# Patient Record
Sex: MALE | Race: BLACK OR AFRICAN AMERICAN | NOT HISPANIC OR LATINO | Employment: OTHER | URBAN - METROPOLITAN AREA
[De-identification: names, ages, dates, MRNs, and addresses within clinical notes are randomized per-mention and may not be internally consistent; named-entity substitution may affect disease eponyms.]

---

## 2021-06-18 ENCOUNTER — TELEPHONE (OUTPATIENT)
Dept: ADMINISTRATIVE | Facility: OTHER | Age: 66
End: 2021-06-18

## 2022-04-06 ENCOUNTER — TELEPHONE (OUTPATIENT)
Dept: INTERNAL MEDICINE | Facility: CLINIC | Age: 67
End: 2022-04-06
Payer: MEDICARE

## 2022-04-06 NOTE — TELEPHONE ENCOUNTER
----- Message from Gabriela Jo sent at 4/6/2022  1:18 PM CDT -----  Contact: [phone] Tiera Cook is calling to a;so get a recommendation for a dentist.Please advise

## 2023-01-10 ENCOUNTER — TELEPHONE (OUTPATIENT)
Dept: PRIMARY CARE CLINIC | Facility: CLINIC | Age: 68
End: 2023-01-10
Payer: MEDICARE

## 2023-01-10 NOTE — TELEPHONE ENCOUNTER
----- Message from Breanna Brumfield sent at 1/10/2023  1:06 PM CST -----  Nigel Chan calling regarding Appointment Access for wanting to see a Primary care doctor as soon as possible, call back [phone]

## 2023-01-25 ENCOUNTER — OFFICE VISIT (OUTPATIENT)
Dept: PRIMARY CARE CLINIC | Facility: CLINIC | Age: 68
End: 2023-01-25
Payer: MEDICARE

## 2023-01-25 VITALS
RESPIRATION RATE: 16 BRPM | WEIGHT: 217.13 LBS | HEART RATE: 68 BPM | OXYGEN SATURATION: 97 % | BODY MASS INDEX: 30.4 KG/M2 | DIASTOLIC BLOOD PRESSURE: 60 MMHG | SYSTOLIC BLOOD PRESSURE: 126 MMHG | HEIGHT: 71 IN

## 2023-01-25 DIAGNOSIS — R73.09 INCREASED GLUCOSE LEVEL: ICD-10-CM

## 2023-01-25 DIAGNOSIS — R73.09 OTHER ABNORMAL GLUCOSE: ICD-10-CM

## 2023-01-25 DIAGNOSIS — Z11.59 NEED FOR HEPATITIS C SCREENING TEST: ICD-10-CM

## 2023-01-25 DIAGNOSIS — Z12.5 ENCOUNTER FOR SCREENING FOR MALIGNANT NEOPLASM OF PROSTATE: ICD-10-CM

## 2023-01-25 DIAGNOSIS — Z11.4 ENCOUNTER FOR SCREENING FOR HIV: ICD-10-CM

## 2023-01-25 DIAGNOSIS — E78.5 DYSLIPIDEMIA: ICD-10-CM

## 2023-01-25 DIAGNOSIS — F17.210 CIGARETTE SMOKER: ICD-10-CM

## 2023-01-25 DIAGNOSIS — R39.11 URINARY HESITANCY: ICD-10-CM

## 2023-01-25 DIAGNOSIS — Z76.89 ENCOUNTER TO ESTABLISH CARE: Primary | ICD-10-CM

## 2023-01-25 DIAGNOSIS — Z12.11 COLON CANCER SCREENING: ICD-10-CM

## 2023-01-25 DIAGNOSIS — Z87.891 FORMER SMOKER: ICD-10-CM

## 2023-01-25 PROCEDURE — 93010 ELECTROCARDIOGRAM REPORT: CPT | Mod: HCWC,S$GLB,, | Performed by: INTERNAL MEDICINE

## 2023-01-25 PROCEDURE — 93005 ELECTROCARDIOGRAM TRACING: CPT | Mod: HCWC,S$GLB,, | Performed by: STUDENT IN AN ORGANIZED HEALTH CARE EDUCATION/TRAINING PROGRAM

## 2023-01-25 PROCEDURE — 1101F PT FALLS ASSESS-DOCD LE1/YR: CPT | Mod: HCWC,CPTII,S$GLB, | Performed by: STUDENT IN AN ORGANIZED HEALTH CARE EDUCATION/TRAINING PROGRAM

## 2023-01-25 PROCEDURE — 1159F MED LIST DOCD IN RCRD: CPT | Mod: HCWC,CPTII,S$GLB, | Performed by: STUDENT IN AN ORGANIZED HEALTH CARE EDUCATION/TRAINING PROGRAM

## 2023-01-25 PROCEDURE — 3078F DIAST BP <80 MM HG: CPT | Mod: HCWC,CPTII,S$GLB, | Performed by: STUDENT IN AN ORGANIZED HEALTH CARE EDUCATION/TRAINING PROGRAM

## 2023-01-25 PROCEDURE — 99999 PR PBB SHADOW E&M-EST. PATIENT-LVL III: CPT | Mod: PBBFAC,HCWC,, | Performed by: STUDENT IN AN ORGANIZED HEALTH CARE EDUCATION/TRAINING PROGRAM

## 2023-01-25 PROCEDURE — 1126F PR PAIN SEVERITY QUANTIFIED, NO PAIN PRESENT: ICD-10-PCS | Mod: HCWC,CPTII,S$GLB, | Performed by: STUDENT IN AN ORGANIZED HEALTH CARE EDUCATION/TRAINING PROGRAM

## 2023-01-25 PROCEDURE — 1101F PR PT FALLS ASSESS DOC 0-1 FALLS W/OUT INJ PAST YR: ICD-10-PCS | Mod: HCWC,CPTII,S$GLB, | Performed by: STUDENT IN AN ORGANIZED HEALTH CARE EDUCATION/TRAINING PROGRAM

## 2023-01-25 PROCEDURE — 3074F SYST BP LT 130 MM HG: CPT | Mod: HCWC,CPTII,S$GLB, | Performed by: STUDENT IN AN ORGANIZED HEALTH CARE EDUCATION/TRAINING PROGRAM

## 2023-01-25 PROCEDURE — 1160F RVW MEDS BY RX/DR IN RCRD: CPT | Mod: HCWC,CPTII,S$GLB, | Performed by: STUDENT IN AN ORGANIZED HEALTH CARE EDUCATION/TRAINING PROGRAM

## 2023-01-25 PROCEDURE — 3074F PR MOST RECENT SYSTOLIC BLOOD PRESSURE < 130 MM HG: ICD-10-PCS | Mod: HCWC,CPTII,S$GLB, | Performed by: STUDENT IN AN ORGANIZED HEALTH CARE EDUCATION/TRAINING PROGRAM

## 2023-01-25 PROCEDURE — 93010 EKG 12-LEAD: ICD-10-PCS | Mod: HCWC,S$GLB,, | Performed by: INTERNAL MEDICINE

## 2023-01-25 PROCEDURE — 99999 PR PBB SHADOW E&M-EST. PATIENT-LVL III: ICD-10-PCS | Mod: PBBFAC,HCWC,, | Performed by: STUDENT IN AN ORGANIZED HEALTH CARE EDUCATION/TRAINING PROGRAM

## 2023-01-25 PROCEDURE — 93005 EKG 12-LEAD: ICD-10-PCS | Mod: HCWC,S$GLB,, | Performed by: STUDENT IN AN ORGANIZED HEALTH CARE EDUCATION/TRAINING PROGRAM

## 2023-01-25 PROCEDURE — 99204 OFFICE O/P NEW MOD 45 MIN: CPT | Mod: HCWC,S$GLB,, | Performed by: STUDENT IN AN ORGANIZED HEALTH CARE EDUCATION/TRAINING PROGRAM

## 2023-01-25 PROCEDURE — 3008F PR BODY MASS INDEX (BMI) DOCUMENTED: ICD-10-PCS | Mod: HCWC,CPTII,S$GLB, | Performed by: STUDENT IN AN ORGANIZED HEALTH CARE EDUCATION/TRAINING PROGRAM

## 2023-01-25 PROCEDURE — 3288F PR FALLS RISK ASSESSMENT DOCUMENTED: ICD-10-PCS | Mod: HCWC,CPTII,S$GLB, | Performed by: STUDENT IN AN ORGANIZED HEALTH CARE EDUCATION/TRAINING PROGRAM

## 2023-01-25 PROCEDURE — 1159F PR MEDICATION LIST DOCUMENTED IN MEDICAL RECORD: ICD-10-PCS | Mod: HCWC,CPTII,S$GLB, | Performed by: STUDENT IN AN ORGANIZED HEALTH CARE EDUCATION/TRAINING PROGRAM

## 2023-01-25 PROCEDURE — 3078F PR MOST RECENT DIASTOLIC BLOOD PRESSURE < 80 MM HG: ICD-10-PCS | Mod: HCWC,CPTII,S$GLB, | Performed by: STUDENT IN AN ORGANIZED HEALTH CARE EDUCATION/TRAINING PROGRAM

## 2023-01-25 PROCEDURE — 1126F AMNT PAIN NOTED NONE PRSNT: CPT | Mod: HCWC,CPTII,S$GLB, | Performed by: STUDENT IN AN ORGANIZED HEALTH CARE EDUCATION/TRAINING PROGRAM

## 2023-01-25 PROCEDURE — 3288F FALL RISK ASSESSMENT DOCD: CPT | Mod: HCWC,CPTII,S$GLB, | Performed by: STUDENT IN AN ORGANIZED HEALTH CARE EDUCATION/TRAINING PROGRAM

## 2023-01-25 PROCEDURE — 99204 PR OFFICE/OUTPT VISIT, NEW, LEVL IV, 45-59 MIN: ICD-10-PCS | Mod: HCWC,S$GLB,, | Performed by: STUDENT IN AN ORGANIZED HEALTH CARE EDUCATION/TRAINING PROGRAM

## 2023-01-25 PROCEDURE — 3008F BODY MASS INDEX DOCD: CPT | Mod: HCWC,CPTII,S$GLB, | Performed by: STUDENT IN AN ORGANIZED HEALTH CARE EDUCATION/TRAINING PROGRAM

## 2023-01-25 PROCEDURE — 1160F PR REVIEW ALL MEDS BY PRESCRIBER/CLIN PHARMACIST DOCUMENTED: ICD-10-PCS | Mod: HCWC,CPTII,S$GLB, | Performed by: STUDENT IN AN ORGANIZED HEALTH CARE EDUCATION/TRAINING PROGRAM

## 2023-01-25 NOTE — PATIENT INSTRUCTIONS
Est Care:   - 67-year-old man presenting to [facility].  No acute concerns other than decreased urination.  Will be getting screening labs including CBC, CMP, TSH, T4, A1c and lipids.    Urinary hesitancy:   - patient's wife has noted patient having decreased urine stream over the last several months, asked him to be seen.  He states that he is having increased urinary frequency at night.  Denies dysuria or urgency.   - getting PSA today.    Family History of heart disease:   - patient has strong family history of heart disease including heart attacks in parents, grandparents and multiple siblings.   - blood pressure controlled today.  Heart exam normal on physical exam.   - will get EKG as well as will be checking patient's cholesterol.    History prediabetes:   - patient has never been treated for diabetes, but did have a A1c of 5.8 and 5.9 back in 2013.  Will recheck A1c today.

## 2023-01-25 NOTE — PROGRESS NOTES
"Subjective:           Patient ID: Nigel Chan is a 67 y.o. male who presents today with a chief complaint of est care.    Chief Complaint:   Establish Care      History of Present Illness:    66yo male presenting to est care.  Has not been getting care from PCP for years.    Had been in [place] and not treatment since return.    Has been having urinary retention and decreased flow.     Not other acute complaints at this time.     Had stress or Echo after [employer] at [employer] that was reportedly abnormal.     Review of Systems   Constitutional: Negative.  Negative for fatigue, fever and unexpected weight change.   HENT: Negative.  Negative for congestion, sinus pressure, sinus pain and sneezing.    Eyes: Negative.    Respiratory: Negative.  Negative for cough, shortness of breath and wheezing.    Cardiovascular: Negative.  Negative for chest pain, palpitations and leg swelling.   Gastrointestinal: Negative.  Negative for abdominal distention, constipation, diarrhea, nausea and vomiting.   Endocrine: Negative.    Genitourinary:  Positive for difficulty urinating and frequency. Negative for urgency.   Musculoskeletal: Negative.  Negative for arthralgias and back pain.   Skin: Negative.  Negative for rash.   Allergic/Immunologic: Negative for food allergies.   Neurological:  Negative for weakness, light-headedness, numbness and headaches.   Psychiatric/Behavioral: Negative.  Negative for sleep disturbance.          Objective:        Vitals:    01/25/23 0910   BP: 126/60   BP Location: Right arm   Patient Position: Sitting   BP Method: Medium (Manual)   Pulse: 68   Resp: 16   SpO2: 97%   Weight: 98.5 kg (217 lb 2.5 oz)   Height: 5' 11" (1.803 m)       Body mass index is 30.29 kg/m².      Physical Exam  Vitals reviewed.   Constitutional:       General: He is not in acute distress.     Appearance: Normal appearance. He is obese. He is not ill-appearing.      Comments: As per BMI.   HENT:      Head: Normocephalic and " atraumatic.      Right Ear: Tympanic membrane and external ear normal.      Left Ear: Tympanic membrane and external ear normal.      Nose: Nose normal. No rhinorrhea.   Eyes:      Extraocular Movements: Extraocular movements intact.      Conjunctiva/sclera: Conjunctivae normal.   Cardiovascular:      Rate and Rhythm: Normal rate and regular rhythm.      Heart sounds: No murmur heard.  Pulmonary:      Effort: Pulmonary effort is normal. No respiratory distress.      Breath sounds: No wheezing.   Abdominal:      Tenderness: There is no right CVA tenderness.   Musculoskeletal:         General: No swelling or deformity.      Cervical back: Normal range of motion.      Right lower leg: No edema.      Left lower leg: No edema.   Skin:     Capillary Refill: Capillary refill takes less than 2 seconds.   Neurological:      General: No focal deficit present.      Mental Status: He is alert and oriented to person, place, and time.      Gait: Gait normal.   Psychiatric:         Mood and Affect: Mood normal.           No results found for: NA, K, CL, CO2, BUN, CREATININE, GLUCOSE, ANIONGAP  No results found for: HGBA1C  No results found for: BNP, BNPTRIAGEBLO    No results found for: WBC, HGB, HCT, PLT, GRAN  No results found for: CHOL, HDL, LDLCALC, TRIG     No current outpatient medications on file.     No outpatient encounter medications on file as of 1/25/2023.     No facility-administered encounter medications on file as of 1/25/2023.          Assessment:       1. Encounter to establish care    2. Urinary hesitancy    3. Dyslipidemia    4. Former smoker    5. Increased glucose level    6. Cigarette smoker    7. Colon cancer screening    8. Encounter for screening for HIV    9. Need for hepatitis C screening test    10. Other abnormal glucose    11. Encounter for screening for malignant neoplasm of prostate           Plan:       Encounter to establish care    Urinary hesitancy  -     CBC Auto Differential; Future; Expected  date: 01/25/2023  -     Comprehensive Metabolic Panel; Future; Expected date: 01/25/2023  -     Lipid Panel; Future; Expected date: 01/25/2023  -     Hemoglobin A1C; Future; Expected date: 01/25/2023  -     TSH; Future; Expected date: 01/25/2023  -     PSA, Screening; Future; Expected date: 01/25/2023    Dyslipidemia  -     CBC Auto Differential; Future; Expected date: 01/25/2023  -     Comprehensive Metabolic Panel; Future; Expected date: 01/25/2023  -     Lipid Panel; Future; Expected date: 01/25/2023  -     Hemoglobin A1C; Future; Expected date: 01/25/2023  -     TSH; Future; Expected date: 01/25/2023  -     EKG 12-lead    Former smoker  -     CT Chest Lung Screening Low Dose; Future; Expected date: 01/25/2023    Increased glucose level    Cigarette smoker  -     CT Chest Lung Screening Low Dose; Future; Expected date: 01/25/2023    Colon cancer screening  -     Cologuard Screening (Multitarget Stool DNA); Future; Expected date: 01/25/2023    Encounter for screening for HIV  -     HIV 1/2 Ag/Ab (4th Gen); Future; Expected date: 01/25/2023    Need for hepatitis C screening test  -     Hepatitis C Antibody; Future; Expected date: 01/25/2023    Other abnormal glucose  -     Hemoglobin A1C; Future; Expected date: 01/25/2023    Encounter for screening for malignant neoplasm of prostate  -     PSA, Screening; Future; Expected date: 01/25/2023               Est Care:   - 67-year-old man presenting to [facility] care.  No acute concerns other than decreased urination.  Will be getting screening labs including CBC, CMP, TSH, T4, A1c and lipids.    Urinary hesitancy:   - patient's wife has noted patient having decreased urine stream over the last several months, asked him to be seen.  He states that he is having increased urinary frequency at night.  Denies dysuria or urgency.   - getting PSA today.    Family History of heart disease:   - patient has strong family history of heart disease including heart attacks in parents,  grandparents and multiple siblings.   - blood pressure controlled today.  Heart exam normal on physical exam.   - will get EKG as well as will be checking patient's cholesterol.    History prediabetes:   - patient has never been treated for diabetes, but did have a A1c of 5.8 and 5.9 back in 2013.  Will recheck A1c today.

## 2023-02-02 DIAGNOSIS — R73.03 PREDIABETES: ICD-10-CM

## 2023-02-02 DIAGNOSIS — R97.20 ELEVATED PSA: Primary | ICD-10-CM

## 2023-02-02 DIAGNOSIS — E78.5 HYPERLIPIDEMIA, UNSPECIFIED HYPERLIPIDEMIA TYPE: ICD-10-CM

## 2023-02-02 RX ORDER — ATORVASTATIN CALCIUM 40 MG/1
40 TABLET, FILM COATED ORAL DAILY
Qty: 90 TABLET | Refills: 3 | Status: SHIPPED | OUTPATIENT
Start: 2023-02-02 | End: 2023-03-21 | Stop reason: SDUPTHER

## 2023-02-09 LAB — NONINV COLON CA DNA+OCC BLD SCRN STL QL: NEGATIVE

## 2023-03-21 ENCOUNTER — OFFICE VISIT (OUTPATIENT)
Dept: PRIMARY CARE CLINIC | Facility: CLINIC | Age: 68
End: 2023-03-21
Payer: MEDICARE

## 2023-03-21 VITALS
RESPIRATION RATE: 16 BRPM | HEIGHT: 71 IN | OXYGEN SATURATION: 98 % | DIASTOLIC BLOOD PRESSURE: 72 MMHG | HEART RATE: 62 BPM | BODY MASS INDEX: 30.53 KG/M2 | WEIGHT: 218.06 LBS | SYSTOLIC BLOOD PRESSURE: 138 MMHG | TEMPERATURE: 97 F

## 2023-03-21 DIAGNOSIS — Z12.5 ENCOUNTER FOR SCREENING FOR MALIGNANT NEOPLASM OF PROSTATE: ICD-10-CM

## 2023-03-21 DIAGNOSIS — E78.5 HYPERLIPIDEMIA, UNSPECIFIED HYPERLIPIDEMIA TYPE: ICD-10-CM

## 2023-03-21 DIAGNOSIS — R73.03 PREDIABETES: ICD-10-CM

## 2023-03-21 DIAGNOSIS — R97.20 ELEVATED PSA: ICD-10-CM

## 2023-03-21 DIAGNOSIS — Z00.00 ANNUAL PHYSICAL EXAM: Primary | ICD-10-CM

## 2023-03-21 PROCEDURE — 3075F PR MOST RECENT SYSTOLIC BLOOD PRESS GE 130-139MM HG: ICD-10-PCS | Mod: HCWC,CPTII,S$GLB, | Performed by: STUDENT IN AN ORGANIZED HEALTH CARE EDUCATION/TRAINING PROGRAM

## 2023-03-21 PROCEDURE — 3075F SYST BP GE 130 - 139MM HG: CPT | Mod: HCWC,CPTII,S$GLB, | Performed by: STUDENT IN AN ORGANIZED HEALTH CARE EDUCATION/TRAINING PROGRAM

## 2023-03-21 PROCEDURE — 3078F PR MOST RECENT DIASTOLIC BLOOD PRESSURE < 80 MM HG: ICD-10-PCS | Mod: HCWC,CPTII,S$GLB, | Performed by: STUDENT IN AN ORGANIZED HEALTH CARE EDUCATION/TRAINING PROGRAM

## 2023-03-21 PROCEDURE — 3044F HG A1C LEVEL LT 7.0%: CPT | Mod: HCWC,CPTII,S$GLB, | Performed by: STUDENT IN AN ORGANIZED HEALTH CARE EDUCATION/TRAINING PROGRAM

## 2023-03-21 PROCEDURE — 1126F AMNT PAIN NOTED NONE PRSNT: CPT | Mod: HCWC,CPTII,S$GLB, | Performed by: STUDENT IN AN ORGANIZED HEALTH CARE EDUCATION/TRAINING PROGRAM

## 2023-03-21 PROCEDURE — 3044F PR MOST RECENT HEMOGLOBIN A1C LEVEL <7.0%: ICD-10-PCS | Mod: HCWC,CPTII,S$GLB, | Performed by: STUDENT IN AN ORGANIZED HEALTH CARE EDUCATION/TRAINING PROGRAM

## 2023-03-21 PROCEDURE — 1126F PR PAIN SEVERITY QUANTIFIED, NO PAIN PRESENT: ICD-10-PCS | Mod: HCWC,CPTII,S$GLB, | Performed by: STUDENT IN AN ORGANIZED HEALTH CARE EDUCATION/TRAINING PROGRAM

## 2023-03-21 PROCEDURE — 3008F PR BODY MASS INDEX (BMI) DOCUMENTED: ICD-10-PCS | Mod: HCWC,CPTII,S$GLB, | Performed by: STUDENT IN AN ORGANIZED HEALTH CARE EDUCATION/TRAINING PROGRAM

## 2023-03-21 PROCEDURE — 99214 OFFICE O/P EST MOD 30 MIN: CPT | Mod: HCWC,S$GLB,, | Performed by: STUDENT IN AN ORGANIZED HEALTH CARE EDUCATION/TRAINING PROGRAM

## 2023-03-21 PROCEDURE — 1101F PT FALLS ASSESS-DOCD LE1/YR: CPT | Mod: HCWC,CPTII,S$GLB, | Performed by: STUDENT IN AN ORGANIZED HEALTH CARE EDUCATION/TRAINING PROGRAM

## 2023-03-21 PROCEDURE — 3288F PR FALLS RISK ASSESSMENT DOCUMENTED: ICD-10-PCS | Mod: HCWC,CPTII,S$GLB, | Performed by: STUDENT IN AN ORGANIZED HEALTH CARE EDUCATION/TRAINING PROGRAM

## 2023-03-21 PROCEDURE — 1160F PR REVIEW ALL MEDS BY PRESCRIBER/CLIN PHARMACIST DOCUMENTED: ICD-10-PCS | Mod: HCWC,CPTII,S$GLB, | Performed by: STUDENT IN AN ORGANIZED HEALTH CARE EDUCATION/TRAINING PROGRAM

## 2023-03-21 PROCEDURE — 1159F PR MEDICATION LIST DOCUMENTED IN MEDICAL RECORD: ICD-10-PCS | Mod: HCWC,CPTII,S$GLB, | Performed by: STUDENT IN AN ORGANIZED HEALTH CARE EDUCATION/TRAINING PROGRAM

## 2023-03-21 PROCEDURE — 99214 PR OFFICE/OUTPT VISIT, EST, LEVL IV, 30-39 MIN: ICD-10-PCS | Mod: HCWC,S$GLB,, | Performed by: STUDENT IN AN ORGANIZED HEALTH CARE EDUCATION/TRAINING PROGRAM

## 2023-03-21 PROCEDURE — 3008F BODY MASS INDEX DOCD: CPT | Mod: HCWC,CPTII,S$GLB, | Performed by: STUDENT IN AN ORGANIZED HEALTH CARE EDUCATION/TRAINING PROGRAM

## 2023-03-21 PROCEDURE — 1101F PR PT FALLS ASSESS DOC 0-1 FALLS W/OUT INJ PAST YR: ICD-10-PCS | Mod: HCWC,CPTII,S$GLB, | Performed by: STUDENT IN AN ORGANIZED HEALTH CARE EDUCATION/TRAINING PROGRAM

## 2023-03-21 PROCEDURE — 3078F DIAST BP <80 MM HG: CPT | Mod: HCWC,CPTII,S$GLB, | Performed by: STUDENT IN AN ORGANIZED HEALTH CARE EDUCATION/TRAINING PROGRAM

## 2023-03-21 PROCEDURE — 3288F FALL RISK ASSESSMENT DOCD: CPT | Mod: HCWC,CPTII,S$GLB, | Performed by: STUDENT IN AN ORGANIZED HEALTH CARE EDUCATION/TRAINING PROGRAM

## 2023-03-21 PROCEDURE — 1160F RVW MEDS BY RX/DR IN RCRD: CPT | Mod: HCWC,CPTII,S$GLB, | Performed by: STUDENT IN AN ORGANIZED HEALTH CARE EDUCATION/TRAINING PROGRAM

## 2023-03-21 PROCEDURE — 99999 PR PBB SHADOW E&M-EST. PATIENT-LVL IV: CPT | Mod: PBBFAC,HCWC,, | Performed by: STUDENT IN AN ORGANIZED HEALTH CARE EDUCATION/TRAINING PROGRAM

## 2023-03-21 PROCEDURE — 99999 PR PBB SHADOW E&M-EST. PATIENT-LVL IV: ICD-10-PCS | Mod: PBBFAC,HCWC,, | Performed by: STUDENT IN AN ORGANIZED HEALTH CARE EDUCATION/TRAINING PROGRAM

## 2023-03-21 PROCEDURE — 1159F MED LIST DOCD IN RCRD: CPT | Mod: HCWC,CPTII,S$GLB, | Performed by: STUDENT IN AN ORGANIZED HEALTH CARE EDUCATION/TRAINING PROGRAM

## 2023-03-21 RX ORDER — ATORVASTATIN CALCIUM 40 MG/1
40 TABLET, FILM COATED ORAL DAILY
Qty: 90 TABLET | Refills: 3 | Status: SHIPPED | OUTPATIENT
Start: 2023-03-21 | End: 2023-03-21 | Stop reason: SDUPTHER

## 2023-03-21 RX ORDER — TAMSULOSIN HYDROCHLORIDE 0.4 MG/1
0.4 CAPSULE ORAL DAILY
Qty: 90 CAPSULE | Refills: 3 | Status: SHIPPED | OUTPATIENT
Start: 2023-03-21 | End: 2023-09-21 | Stop reason: SDUPTHER

## 2023-03-21 RX ORDER — ATORVASTATIN CALCIUM 80 MG/1
80 TABLET, FILM COATED ORAL DAILY
Qty: 90 TABLET | Refills: 3 | Status: SHIPPED | OUTPATIENT
Start: 2023-03-21 | End: 2024-03-20

## 2023-03-21 NOTE — PROGRESS NOTES
"Subjective:           Patient ID: Nigel Chan is a 68 y.o. male who presents today with a chief complaint of f/u labs.    Chief Complaint:   Follow-up (Lab results)      History of Present Illness:      PreDiabetes:   - Last A1c was 5.9%    Prostate:   - patient states that he has been having urgency, hesitancy sputtering and frequency. States that he does feel like he is getting empty, but still having to go again quickly.   - states he finds this stressful.    HLD:   Was on Crestor previously, took daily and tolerated well.  Was on that for 2 years, but stopped after Huong and getting back from [place].   Open to a med.    Will be back in [place] for the next 10 months.       Review of Systems   Constitutional: Negative.  Negative for fatigue, fever and unexpected weight change.   HENT: Negative.  Negative for congestion, sinus pressure, sinus pain and sneezing.    Eyes: Negative.    Respiratory: Negative.  Negative for cough, shortness of breath and wheezing.    Cardiovascular: Negative.  Negative for chest pain, palpitations and leg swelling.   Gastrointestinal: Negative.  Negative for abdominal distention, constipation, diarrhea, nausea and vomiting.   Endocrine: Negative.    Genitourinary:  Positive for decreased urine volume, difficulty urinating and frequency. Negative for urgency.   Musculoskeletal: Negative.  Negative for arthralgias and back pain.   Skin: Negative.  Negative for rash.   Allergic/Immunologic: Negative for food allergies.   Neurological:  Negative for weakness, light-headedness, numbness and headaches.   Psychiatric/Behavioral: Negative.  Negative for sleep disturbance.          Objective:        Vitals:    03/21/23 1154   BP: 138/72   BP Location: Right arm   Patient Position: Sitting   BP Method: Medium (Manual)   Pulse: 62   Resp: 16   Temp: 97.1 °F (36.2 °C)   TempSrc: Temporal   SpO2: 98%   Weight: 98.9 kg (218 lb 0.6 oz)   Height: 5' 11" (1.803 m)       Body mass index is " 30.41 kg/m².      Physical Exam  Vitals reviewed.   Constitutional:       General: He is not in acute distress.     Appearance: Normal appearance. He is obese. He is not ill-appearing.      Comments: As per BMI.   HENT:      Head: Normocephalic and atraumatic.      Right Ear: Tympanic membrane and external ear normal.      Left Ear: Tympanic membrane and external ear normal.      Nose: Nose normal. No rhinorrhea.   Eyes:      Extraocular Movements: Extraocular movements intact.      Conjunctiva/sclera: Conjunctivae normal.   Cardiovascular:      Rate and Rhythm: Normal rate and regular rhythm.      Heart sounds: No murmur heard.  Pulmonary:      Effort: Pulmonary effort is normal. No respiratory distress.      Breath sounds: No wheezing.   Abdominal:      Tenderness: There is no right CVA tenderness.   Musculoskeletal:         General: No swelling or deformity.      Cervical back: Normal range of motion.      Right lower leg: No edema.      Left lower leg: No edema.   Skin:     Capillary Refill: Capillary refill takes less than 2 seconds.   Neurological:      General: No focal deficit present.      Mental Status: He is alert and oriented to person, place, and time.      Gait: Gait normal.   Psychiatric:         Mood and Affect: Mood normal.           Lab Results   Component Value Date     02/01/2023    K 4.0 02/01/2023     02/01/2023    CO2 26 02/01/2023    BUN 11 02/01/2023    CREATININE 1.3 02/01/2023    ANIONGAP 10 02/01/2023     Lab Results   Component Value Date    HGBA1C 5.9 (H) 02/01/2023     No results found for: BNP, BNPTRIAGEBLO    Lab Results   Component Value Date    WBC 4.54 02/01/2023    HGB 14.6 02/01/2023    HCT 44.0 02/01/2023     02/01/2023    GRAN 2.4 02/01/2023    GRAN 53.8 02/01/2023     Lab Results   Component Value Date    CHOL 393 (H) 02/01/2023    HDL 61 02/01/2023    LDLCALC 303.2 (H) 02/01/2023    TRIG 144 02/01/2023          Current Outpatient Medications:      atorvastatin (LIPITOR) 80 MG tablet, Take 1 tablet (80 mg total) by mouth once daily., Disp: 90 tablet, Rfl: 3    tamsulosin (FLOMAX) 0.4 mg Cap, Take 1 capsule (0.4 mg total) by mouth once daily., Disp: 90 capsule, Rfl: 3     Outpatient Encounter Medications as of 3/21/2023   Medication Sig Dispense Refill    atorvastatin (LIPITOR) 80 MG tablet Take 1 tablet (80 mg total) by mouth once daily. 90 tablet 3    tamsulosin (FLOMAX) 0.4 mg Cap Take 1 capsule (0.4 mg total) by mouth once daily. 90 capsule 3    [DISCONTINUED] atorvastatin (LIPITOR) 40 MG tablet Take 1 tablet (40 mg total) by mouth once daily. (Patient not taking: Reported on 3/21/2023) 90 tablet 3    [DISCONTINUED] atorvastatin (LIPITOR) 40 MG tablet Take 1 tablet (40 mg total) by mouth once daily. 90 tablet 3     No facility-administered encounter medications on file as of 3/21/2023.          Assessment:       1. Annual physical exam    2. Hyperlipidemia, unspecified hyperlipidemia type    3. Elevated PSA    4. Prediabetes    5. Encounter for screening for malignant neoplasm of prostate           Plan:       Annual physical exam    Hyperlipidemia, unspecified hyperlipidemia type  -     Discontinue: atorvastatin (LIPITOR) 40 MG tablet; Take 1 tablet (40 mg total) by mouth once daily.  Dispense: 90 tablet; Refill: 3  -     atorvastatin (LIPITOR) 80 MG tablet; Take 1 tablet (80 mg total) by mouth once daily.  Dispense: 90 tablet; Refill: 3  -     Lipid Panel; Future; Expected date: 09/21/2023    Elevated PSA  -     tamsulosin (FLOMAX) 0.4 mg Cap; Take 1 capsule (0.4 mg total) by mouth once daily.  Dispense: 90 capsule; Refill: 3  -     PSA, Screening; Future; Expected date: 09/21/2023    Prediabetes  -     Discontinue: atorvastatin (LIPITOR) 40 MG tablet; Take 1 tablet (40 mg total) by mouth once daily.  Dispense: 90 tablet; Refill: 3  -     atorvastatin (LIPITOR) 80 MG tablet; Take 1 tablet (80 mg total) by mouth once daily.  Dispense: 90 tablet; Refill:  3  -     Lipid Panel; Future; Expected date: 09/21/2023  -     PSA, Screening; Future; Expected date: 09/21/2023    Encounter for screening for malignant neoplasm of prostate  -     PSA, Screening; Future; Expected date: 09/21/2023                 Prostate:   - patient states that he has been having urgency, hesitancy sputtering and frequency. States that he does feel like he is getting empty, but still having to go again quickly.   - states he finds this stressful.   - starting on Tamsulosin 0.4mg daily.    HLD:   Was on Crestor previously, took daily and tolerated well.  Was on that for 2 years, but stopped after Huong and getting back from [place].   Open to a med.    Will be back in [place] for the next 10 months.    - start on Atorvastatin 40mg daily for 2 weeks, then increase to 80mg daily.     PreDiabetes:   - Last A1c was 5.9%

## 2023-03-21 NOTE — PATIENT INSTRUCTIONS
Prostate:   - patient states that he has been having urgency, hesitancy sputtering and frequency. States that he does feel like he is getting empty, but still having to go again quickly.   - states he finds this stressful.   - starting on Tamsulosin 0.4mg daily.    HLD:   Was on Crestor previously, took daily and tolerated well.  Was on that for 2 years, but stopped after Huong and getting back from [place].   Open to a med.    Will be back in [place] for the next 10 months.    - start on Atorvastatin 40mg daily for 2 weeks, then increase to 80mg daily.     PreDiabetes:   - Last A1c was 5.9%

## 2023-06-12 ENCOUNTER — PES CALL (OUTPATIENT)
Dept: ADMINISTRATIVE | Facility: CLINIC | Age: 68
End: 2023-06-12
Payer: MEDICARE

## 2023-07-26 ENCOUNTER — TELEPHONE (OUTPATIENT)
Dept: PULMONOLOGY | Facility: CLINIC | Age: 68
End: 2023-07-26
Payer: MEDICARE

## 2023-07-26 DIAGNOSIS — Z87.891 HISTORY OF TOBACCO USE: Primary | ICD-10-CM

## 2023-07-26 NOTE — TELEPHONE ENCOUNTER
Called to schedule follow up ldct.  Patient significant other states patient is already scheduled for Monday 7/31.

## 2023-08-09 ENCOUNTER — TELEPHONE (OUTPATIENT)
Dept: PRIMARY CARE CLINIC | Facility: CLINIC | Age: 68
End: 2023-08-09
Payer: MEDICARE

## 2023-08-09 NOTE — TELEPHONE ENCOUNTER
----- Message from Julissa Zheng sent at 8/9/2023  9:17 AM CDT -----  Contact: Pt [phone]  [id number]    Type: Test Results    What test was performed? CT Scan     Who ordered the test? Dr Jose Martin Li    When and where were the test performed? [facility] 08/02/23    Would you like response via [employer]: Call    Comments:

## 2023-09-21 ENCOUNTER — OFFICE VISIT (OUTPATIENT)
Dept: PRIMARY CARE CLINIC | Facility: CLINIC | Age: 68
End: 2023-09-21
Payer: MEDICARE

## 2023-09-21 VITALS
BODY MASS INDEX: 30.86 KG/M2 | SYSTOLIC BLOOD PRESSURE: 130 MMHG | DIASTOLIC BLOOD PRESSURE: 70 MMHG | OXYGEN SATURATION: 97 % | RESPIRATION RATE: 16 BRPM | HEIGHT: 71 IN | HEART RATE: 66 BPM | TEMPERATURE: 97 F | WEIGHT: 220.44 LBS

## 2023-09-21 DIAGNOSIS — I70.0 AORTIC ATHEROSCLEROSIS: ICD-10-CM

## 2023-09-21 DIAGNOSIS — J43.9 PULMONARY EMPHYSEMA, UNSPECIFIED EMPHYSEMA TYPE: ICD-10-CM

## 2023-09-21 DIAGNOSIS — I73.9 RIGHT LEG CLAUDICATION: Primary | ICD-10-CM

## 2023-09-21 DIAGNOSIS — R73.03 PREDIABETES: ICD-10-CM

## 2023-09-21 DIAGNOSIS — Z12.5 ENCOUNTER FOR SCREENING FOR MALIGNANT NEOPLASM OF PROSTATE: ICD-10-CM

## 2023-09-21 DIAGNOSIS — R35.0 URINARY FREQUENCY: ICD-10-CM

## 2023-09-21 DIAGNOSIS — Z87.891 FORMER SMOKER: ICD-10-CM

## 2023-09-21 DIAGNOSIS — E78.5 HYPERLIPIDEMIA, UNSPECIFIED HYPERLIPIDEMIA TYPE: ICD-10-CM

## 2023-09-21 DIAGNOSIS — R97.20 ELEVATED PSA: ICD-10-CM

## 2023-09-21 PROCEDURE — 3075F PR MOST RECENT SYSTOLIC BLOOD PRESS GE 130-139MM HG: ICD-10-PCS | Mod: HCWC,CPTII,S$GLB, | Performed by: STUDENT IN AN ORGANIZED HEALTH CARE EDUCATION/TRAINING PROGRAM

## 2023-09-21 PROCEDURE — 1101F PT FALLS ASSESS-DOCD LE1/YR: CPT | Mod: HCWC,CPTII,S$GLB, | Performed by: STUDENT IN AN ORGANIZED HEALTH CARE EDUCATION/TRAINING PROGRAM

## 2023-09-21 PROCEDURE — 3288F PR FALLS RISK ASSESSMENT DOCUMENTED: ICD-10-PCS | Mod: HCWC,CPTII,S$GLB, | Performed by: STUDENT IN AN ORGANIZED HEALTH CARE EDUCATION/TRAINING PROGRAM

## 2023-09-21 PROCEDURE — 99999 PR PBB SHADOW E&M-EST. PATIENT-LVL IV: ICD-10-PCS | Mod: PBBFAC,HCWC,, | Performed by: STUDENT IN AN ORGANIZED HEALTH CARE EDUCATION/TRAINING PROGRAM

## 2023-09-21 PROCEDURE — 3008F BODY MASS INDEX DOCD: CPT | Mod: HCWC,CPTII,S$GLB, | Performed by: STUDENT IN AN ORGANIZED HEALTH CARE EDUCATION/TRAINING PROGRAM

## 2023-09-21 PROCEDURE — 3044F PR MOST RECENT HEMOGLOBIN A1C LEVEL <7.0%: ICD-10-PCS | Mod: HCWC,CPTII,S$GLB, | Performed by: STUDENT IN AN ORGANIZED HEALTH CARE EDUCATION/TRAINING PROGRAM

## 2023-09-21 PROCEDURE — 99214 OFFICE O/P EST MOD 30 MIN: CPT | Mod: HCWC,S$GLB,, | Performed by: STUDENT IN AN ORGANIZED HEALTH CARE EDUCATION/TRAINING PROGRAM

## 2023-09-21 PROCEDURE — 3078F PR MOST RECENT DIASTOLIC BLOOD PRESSURE < 80 MM HG: ICD-10-PCS | Mod: HCWC,CPTII,S$GLB, | Performed by: STUDENT IN AN ORGANIZED HEALTH CARE EDUCATION/TRAINING PROGRAM

## 2023-09-21 PROCEDURE — 3008F PR BODY MASS INDEX (BMI) DOCUMENTED: ICD-10-PCS | Mod: HCWC,CPTII,S$GLB, | Performed by: STUDENT IN AN ORGANIZED HEALTH CARE EDUCATION/TRAINING PROGRAM

## 2023-09-21 PROCEDURE — 1159F PR MEDICATION LIST DOCUMENTED IN MEDICAL RECORD: ICD-10-PCS | Mod: HCWC,CPTII,S$GLB, | Performed by: STUDENT IN AN ORGANIZED HEALTH CARE EDUCATION/TRAINING PROGRAM

## 2023-09-21 PROCEDURE — 99999 PR PBB SHADOW E&M-EST. PATIENT-LVL IV: CPT | Mod: PBBFAC,HCWC,, | Performed by: STUDENT IN AN ORGANIZED HEALTH CARE EDUCATION/TRAINING PROGRAM

## 2023-09-21 PROCEDURE — 1159F MED LIST DOCD IN RCRD: CPT | Mod: HCWC,CPTII,S$GLB, | Performed by: STUDENT IN AN ORGANIZED HEALTH CARE EDUCATION/TRAINING PROGRAM

## 2023-09-21 PROCEDURE — 3078F DIAST BP <80 MM HG: CPT | Mod: HCWC,CPTII,S$GLB, | Performed by: STUDENT IN AN ORGANIZED HEALTH CARE EDUCATION/TRAINING PROGRAM

## 2023-09-21 PROCEDURE — 3075F SYST BP GE 130 - 139MM HG: CPT | Mod: HCWC,CPTII,S$GLB, | Performed by: STUDENT IN AN ORGANIZED HEALTH CARE EDUCATION/TRAINING PROGRAM

## 2023-09-21 PROCEDURE — 3288F FALL RISK ASSESSMENT DOCD: CPT | Mod: HCWC,CPTII,S$GLB, | Performed by: STUDENT IN AN ORGANIZED HEALTH CARE EDUCATION/TRAINING PROGRAM

## 2023-09-21 PROCEDURE — 1126F AMNT PAIN NOTED NONE PRSNT: CPT | Mod: HCWC,CPTII,S$GLB, | Performed by: STUDENT IN AN ORGANIZED HEALTH CARE EDUCATION/TRAINING PROGRAM

## 2023-09-21 PROCEDURE — 99214 PR OFFICE/OUTPT VISIT, EST, LEVL IV, 30-39 MIN: ICD-10-PCS | Mod: HCWC,S$GLB,, | Performed by: STUDENT IN AN ORGANIZED HEALTH CARE EDUCATION/TRAINING PROGRAM

## 2023-09-21 PROCEDURE — 1126F PR PAIN SEVERITY QUANTIFIED, NO PAIN PRESENT: ICD-10-PCS | Mod: HCWC,CPTII,S$GLB, | Performed by: STUDENT IN AN ORGANIZED HEALTH CARE EDUCATION/TRAINING PROGRAM

## 2023-09-21 PROCEDURE — 1101F PR PT FALLS ASSESS DOC 0-1 FALLS W/OUT INJ PAST YR: ICD-10-PCS | Mod: HCWC,CPTII,S$GLB, | Performed by: STUDENT IN AN ORGANIZED HEALTH CARE EDUCATION/TRAINING PROGRAM

## 2023-09-21 PROCEDURE — 3044F HG A1C LEVEL LT 7.0%: CPT | Mod: HCWC,CPTII,S$GLB, | Performed by: STUDENT IN AN ORGANIZED HEALTH CARE EDUCATION/TRAINING PROGRAM

## 2023-09-21 RX ORDER — TAMSULOSIN HYDROCHLORIDE 0.4 MG/1
0.8 CAPSULE ORAL DAILY
Qty: 180 CAPSULE | Refills: 3 | Status: SHIPPED | OUTPATIENT
Start: 2023-09-21 | End: 2024-09-20

## 2023-09-21 NOTE — PROGRESS NOTES
Subjective:           Patient ID: Nigel Chan is a 68 y.o. male who presents today with a chief complaint of Follow-up (Cholesterol & prostate)  .    Chief Complaint:   Follow-up (Cholesterol & prostate)      History of Present Illness:    Nigel Chan is a 68 y.o. male who presents today with a chief complaint of Follow-up (Cholesterol & prostate)  .      Urination: states that he does have a bit of decreased flow, and having some sensation of incomplete emptying.  Gets up about 2-3 times a night to void.   Has to concentrate on the process a bit to get urine started.     Right calf pain, states gets then with exercise, after 2-3 minute of walking gets pain in right lower leg, will be tolerable and continues to exercise.    Hx smoking, not currently.    Cholesterol - taking statin as directed, has right calf pain, but thinks not related to med.      Review of Systems   Constitutional: Negative.  Negative for fatigue, fever and unexpected weight change.   HENT: Negative.  Negative for congestion, sinus pressure, sinus pain and sneezing.    Eyes: Negative.    Respiratory: Negative.  Negative for cough, shortness of breath and wheezing.    Cardiovascular: Negative.  Negative for chest pain, palpitations and leg swelling.   Gastrointestinal: Negative.  Negative for abdominal distention, constipation, diarrhea, nausea and vomiting.   Endocrine: Negative.    Genitourinary:  Positive for decreased urine volume, difficulty urinating and frequency. Negative for urgency.   Musculoskeletal: Negative.  Negative for arthralgias and back pain.   Skin: Negative.  Negative for rash.   Allergic/Immunologic: Negative for food allergies.   Neurological:  Negative for weakness, light-headedness, numbness and headaches.   Psychiatric/Behavioral: Negative.  Negative for sleep disturbance.            Objective:        Vitals:    09/21/23 0920   BP: 130/70   BP Location: Right arm   Patient Position: Sitting   BP Method:  "Medium (Manual)   Pulse: 66   Resp: 16   Temp: 97.2 °F (36.2 °C)   TempSrc: Temporal   SpO2: 97%   Weight: 100 kg (220 lb 7.4 oz)   Height: 5' 11" (1.803 m)       Body mass index is 30.75 kg/m².      Physical Exam  Vitals reviewed.   Constitutional:       General: He is not in acute distress.     Appearance: Normal appearance. He is obese. He is not ill-appearing.      Comments: As per BMI.   HENT:      Head: Normocephalic and atraumatic.      Right Ear: Tympanic membrane and external ear normal.      Left Ear: Tympanic membrane and external ear normal.      Nose: Nose normal. No rhinorrhea.   Eyes:      Extraocular Movements: Extraocular movements intact.      Conjunctiva/sclera: Conjunctivae normal.   Cardiovascular:      Rate and Rhythm: Normal rate and regular rhythm.      Heart sounds: No murmur heard.  Pulmonary:      Effort: Pulmonary effort is normal. No respiratory distress.      Breath sounds: No wheezing.   Abdominal:      Tenderness: There is no right CVA tenderness.   Musculoskeletal:         General: No swelling or deformity.      Cervical back: Normal range of motion.      Right lower leg: No edema.      Left lower leg: No edema.   Skin:     Capillary Refill: Capillary refill takes less than 2 seconds.   Neurological:      General: No focal deficit present.      Mental Status: He is alert and oriented to person, place, and time.      Gait: Gait normal.   Psychiatric:         Mood and Affect: Mood normal.             Lab Results   Component Value Date     02/01/2023    K 4.0 02/01/2023     02/01/2023    CO2 26 02/01/2023    BUN 11 02/01/2023    CREATININE 1.3 02/01/2023    ANIONGAP 10 02/01/2023     Lab Results   Component Value Date    HGBA1C 5.9 (H) 02/01/2023     No results found for: "BNP", "BNPTRIAGEBLO"    Lab Results   Component Value Date    WBC 4.54 02/01/2023    HGB 14.6 02/01/2023    HCT 44.0 02/01/2023     02/01/2023    GRAN 2.4 02/01/2023    GRAN 53.8 02/01/2023     Lab " Results   Component Value Date    CHOL 393 (H) 02/01/2023    HDL 61 02/01/2023    LDLCALC 303.2 (H) 02/01/2023    TRIG 144 02/01/2023          Current Outpatient Medications:     atorvastatin (LIPITOR) 80 MG tablet, Take 1 tablet (80 mg total) by mouth once daily., Disp: 90 tablet, Rfl: 3    tamsulosin (FLOMAX) 0.4 mg Cap, Take 2 capsules (0.8 mg total) by mouth once daily., Disp: 180 capsule, Rfl: 3     Outpatient Encounter Medications as of 9/21/2023   Medication Sig Dispense Refill    atorvastatin (LIPITOR) 80 MG tablet Take 1 tablet (80 mg total) by mouth once daily. 90 tablet 3    [DISCONTINUED] tamsulosin (FLOMAX) 0.4 mg Cap Take 1 capsule (0.4 mg total) by mouth once daily. 90 capsule 3    tamsulosin (FLOMAX) 0.4 mg Cap Take 2 capsules (0.8 mg total) by mouth once daily. 180 capsule 3     No facility-administered encounter medications on file as of 9/21/2023.          Assessment:       1. Right leg claudication    2. Aortic atherosclerosis    3. Pulmonary emphysema, unspecified emphysema type    4. Elevated PSA    5. Prediabetes    6. Hyperlipidemia, unspecified hyperlipidemia type    7. Former smoker    8. Encounter for screening for malignant neoplasm of prostate    9. Urinary frequency           Plan:       Right leg claudication  -     US Lower Extremity Arteries Right; Future; Expected date: 09/21/2023    Aortic atherosclerosis  -     US Abdominal Aorta; Future; Expected date: 09/21/2023    Pulmonary emphysema, unspecified emphysema type    Elevated PSA  -     PROSTATE SPECIFIC ANTIGEN, DIAGNOSTIC; Future; Expected date: 09/21/2023  -     tamsulosin (FLOMAX) 0.4 mg Cap; Take 2 capsules (0.8 mg total) by mouth once daily.  Dispense: 180 capsule; Refill: 3    Prediabetes  -     Hemoglobin A1C; Future; Expected date: 09/21/2023    Hyperlipidemia, unspecified hyperlipidemia type  -     US Lower Extremity Arteries Right; Future; Expected date: 09/21/2023  -     Lipid Panel; Future; Expected date:  09/21/2023  -     US Abdominal Aorta; Future; Expected date: 09/21/2023    Former smoker  -     US Lower Extremity Arteries Right; Future; Expected date: 09/21/2023  -     US Abdominal Aorta; Future; Expected date: 09/21/2023    Encounter for screening for malignant neoplasm of prostate    Urinary frequency  -     PROSTATE SPECIFIC ANTIGEN, DIAGNOSTIC; Future; Expected date: 09/21/2023                   Hyperlipidemia:    - patient has history of elevated cholesterol, last LDL was 303.   - started on high-dose statin, believes he has been tolerating well, though gets some right calf pain at times, thinks this is more related to a chronic injury that started in high school.   - advised that if muscle cramps become a persistent issue can start on coenzyme Q10.   - will recheck cholesterol, consider adding a secondary agent due to his very high cholesterol level before starting statin.    BPH:   - patient has signs of BPH with decreased urinary stream, hesitancy and sense of incomplete emptying.  Additionally is having nocturia getting up to urinate 2-3 times per night.   - patient was started on Flomax 0.4 mg daily, has not seen significant improvement in symptoms.  Will increase to 0.8 mg daily.   - patient's PSA was 4.1 in February, will recheck on labs today.  If increasing will have patient see Urology.    History of smoking:   - patient former smoker, high cholesterol, advise getting abdominal aortic aneurysm screen.  Order placed.    Right calf pain:   - patient getting claudication and right calf, worse with exercise.  Not present on the left side.  Will get arterial ultrasound of right leg due to his high cholesterol in this pain, concern for possible vascular occlusion or obstruction.

## 2023-09-21 NOTE — PATIENT INSTRUCTIONS
Hyperlipidemia:    - patient has history of elevated cholesterol, last LDL was 303.   - started on high-dose statin, believes he has been tolerating well, though gets some right calf pain at times, thinks this is more related to a chronic injury that started in high school.   - advised that if muscle cramps become a persistent issue can start on coenzyme Q10.   - will recheck cholesterol, consider adding a secondary agent due to his very high cholesterol level before starting statin.    BPH:   - patient has signs of BPH with decreased urinary stream, hesitancy and sense of incomplete emptying.  Additionally is having nocturia getting up to urinate 2-3 times per night.   - patient was started on Flomax 0.4 mg daily, has not seen significant improvement in symptoms.  Will increase to 0.8 mg daily.   - patient's PSA was 4.1 in February, will recheck on labs today.  If increasing will have patient see Urology.    History of smoking:   - patient former smoker, high cholesterol, advise getting abdominal aortic aneurysm screen.  Order placed.    Right calf pain:   - patient getting claudication and right calf, worse with exercise.  Not present on the left side.  Will get arterial ultrasound of right leg due to his high cholesterol in this pain, concern for possible vascular occlusion or obstruction.

## 2023-10-06 ENCOUNTER — TELEPHONE (OUTPATIENT)
Dept: PRIMARY CARE CLINIC | Facility: CLINIC | Age: 68
End: 2023-10-06
Payer: MEDICARE

## 2023-10-06 NOTE — TELEPHONE ENCOUNTER
----- Message from Nataliya Cortez sent at 10/6/2023  2:06 PM CDT -----  Contact: Patient, [phone]  Patient is returning a phone call.  Who left a message for the patient: Coni  Does patient know what this is regarding:  Lab results  Would you like a call back, or a response through your [facility] portal?:   Call back  Comments:  Missed your call, please call him back. Thanks.

## 2023-10-06 NOTE — TELEPHONE ENCOUNTER
----- Message from Celina Oh sent at 10/6/2023  2:17 PM CDT -----  Contact: Pt  [phone]  Patient is returning a phone call.  Who left a message for the patient: Coni Gonzalez MA   Does patient know what this is regarding:  yes  Would you like a call back, or a response through your [facility] portal?:   Call back  Comments:     Please call and advise.    Thank You

## 2023-10-26 DIAGNOSIS — I70.90 CALCIFICATION OF ARTERY: ICD-10-CM

## 2023-10-26 DIAGNOSIS — I73.9 RIGHT LEG CLAUDICATION: Primary | ICD-10-CM

## 2023-11-06 ENCOUNTER — TELEPHONE (OUTPATIENT)
Dept: PRIMARY CARE CLINIC | Facility: CLINIC | Age: 68
End: 2023-11-06
Payer: MEDICARE

## 2023-11-06 NOTE — TELEPHONE ENCOUNTER
Pt called (successfully) and notified of test results.      Pt needs another referral to cardiology/ vascular surgery

## 2023-11-06 NOTE — TELEPHONE ENCOUNTER
----- Message from Cindy Villegas sent at 11/6/2023 10:43 AM CST -----  Contact: [phone] @ patient  Patient is returning a phone call.yes   Who left a message for the patient: Coni Gonzalez MA   Does patient know what this is regarding:    Would you like a call back, or a response through your [facility] portal?:   call back   Comments:

## 2023-11-06 NOTE — TELEPHONE ENCOUNTER
----- Message from Nataliya Cortez sent at 11/6/2023 12:52 PM CST -----  Contact: Patient, [phone]  Patient is returning a phone call.  Who left a message for the patient: Noam   Does patient know what this is regarding:  Ultrasound results  Would you like a call back, or a response through your [facility] portal?:   Call back  Comments:  Missed your call, please call him back. Thanks.     This patient has been assessed with a concern for Malnutrition and has been determined to have a diagnosis/diagnoses of Severe protein-calorie malnutrition.    This patient is being managed with:   Diet Consistent Carbohydrate Renal/No Snacks-  Supplement Feeding Modality:  Oral  Nepro Cans or Servings Per Day:  2       Frequency:  Daily  Entered: Jul 14 2022  7:49PM

## 2023-11-07 DIAGNOSIS — I73.9 CLAUDICATION: Primary | ICD-10-CM

## 2023-11-09 DIAGNOSIS — I73.9 CLAUDICATION: Primary | ICD-10-CM

## 2023-12-11 ENCOUNTER — INITIAL CONSULT (OUTPATIENT)
Dept: VASCULAR SURGERY | Facility: CLINIC | Age: 68
End: 2023-12-11
Attending: SURGERY
Payer: MEDICARE

## 2023-12-11 ENCOUNTER — HOSPITAL ENCOUNTER (OUTPATIENT)
Dept: VASCULAR SURGERY | Facility: CLINIC | Age: 68
Discharge: HOME | End: 2023-12-11
Attending: SURGERY
Payer: MEDICARE

## 2023-12-11 VITALS
BODY MASS INDEX: 31.18 KG/M2 | HEART RATE: 73 BPM | SYSTOLIC BLOOD PRESSURE: 159 MMHG | HEIGHT: 71 IN | TEMPERATURE: 98 F | DIASTOLIC BLOOD PRESSURE: 84 MMHG | WEIGHT: 222.69 LBS

## 2023-12-11 DIAGNOSIS — I70.90 CALCIFICATION OF ARTERY: ICD-10-CM

## 2023-12-11 DIAGNOSIS — I70.211 ATHEROSCLEROSIS OF NATIVE ARTERIES OF EXTREMITIES WITH INTERMITTENT CLAUDICATION, RIGHT LEG: Primary | ICD-10-CM

## 2023-12-11 DIAGNOSIS — I73.9 CLAUDICATION: ICD-10-CM

## 2023-12-11 DIAGNOSIS — I73.9 PAD (PERIPHERAL ARTERY DISEASE): Primary | ICD-10-CM

## 2023-12-11 DIAGNOSIS — I73.9 RIGHT LEG CLAUDICATION: ICD-10-CM

## 2023-12-11 PROCEDURE — 93923 PR NON-INVASIVE PHYSIOLOGIC STUDY EXTREMITY 3 LEVELS: ICD-10-PCS | Mod: HCWC,S$GLB,, | Performed by: SURGERY

## 2023-12-11 PROCEDURE — 99203 OFFICE O/P NEW LOW 30 MIN: CPT | Mod: HCWC,S$GLB,, | Performed by: SURGERY

## 2023-12-11 PROCEDURE — 99999 PR PBB SHADOW E&M-EST. PATIENT-LVL III: CPT | Mod: PBBFAC,HCWC,, | Performed by: SURGERY

## 2023-12-11 PROCEDURE — 99999 PR PBB SHADOW E&M-EST. PATIENT-LVL III: ICD-10-PCS | Mod: PBBFAC,HCWC,, | Performed by: SURGERY

## 2023-12-11 PROCEDURE — 93923 UPR/LXTR ART STDY 3+ LVLS: CPT | Mod: HCWC,S$GLB,, | Performed by: SURGERY

## 2023-12-11 PROCEDURE — 99203 PR OFFICE/OUTPT VISIT, NEW, LEVL III, 30-44 MIN: ICD-10-PCS | Mod: HCWC,S$GLB,, | Performed by: SURGERY

## 2023-12-11 NOTE — PROGRESS NOTES
VASCULAR SURGERY NOTE    Patient ID: Nigel Chan is a 68 y.o. male.    I. HISTORY     Chief Complaint: Right leg claudication     HPI: Nigel Chan is a 68 y.o. male who is here today for new patient initial appointment.  He was referred for right leg claudication that has been going on for 1 year.  He complains of leg pain after walking approximately 1/8 of a mile. The pain resolves with rest in 1-2 minutes. The pain is mostly in the right calf but he states that it does feel to be affecting him proximal to the knee more often in the past several months. The quality of the pain is crampy in nature. The pain starts in the right calf first. He denies significant rest pain.     No anticoagulation. He takes a statin.     Past Medical History:   Diagnosis Date    Hyperlipidemia       No past surgical history on file.    Social History     Occupational History    Not on file   Tobacco Use    Smoking status: Former     Current packs/day: 0.00     Types: Cigarettes     Start date: 1974     Quit date: 2022     Years since quittin.9    Smokeless tobacco: Never   Substance and Sexual Activity    Alcohol use: Yes     Comment: less then 1 drink weekly, seasonal    Drug use: Yes     Types: Marijuana    Sexual activity: Yes     Partners: Female     Comment: single partner       Review of Systems   Constitutional: Negative for weight loss.   HENT:  Negative for ear pain and nosebleeds.    Eyes:  Negative for discharge and pain.   Cardiovascular:  Negative for chest pain and palpitations.   Respiratory:  Negative for cough, shortness of breath and wheezing.    Endocrine: Negative for cold intolerance, heat intolerance and polyphagia.   Hematologic/Lymphatic: Negative for adenopathy. Does not bruise/bleed easily.   Skin:  Negative for itching and rash.   Musculoskeletal:  Negative for joint swelling and muscle cramps.   Gastrointestinal:  Negative for abdominal pain, diarrhea, nausea and vomiting.    Genitourinary:  Negative for dysuria and flank pain.   Neurological:  Negative for numbness and seizures.         II. PHYSICAL EXAM     Physical Exam  Constitutional:       General: He is not in acute distress.     Appearance: Normal appearance. He is normal weight. He is not ill-appearing or diaphoretic.   HENT:      Head: Normocephalic and atraumatic.   Eyes:      General: No scleral icterus.        Right eye: No discharge.         Left eye: No discharge.      Extraocular Movements: Extraocular movements intact.      Conjunctiva/sclera: Conjunctivae normal.   Cardiovascular:      Rate and Rhythm: Normal rate and regular rhythm.   Pulmonary:      Effort: Pulmonary effort is normal. No respiratory distress.   Musculoskeletal:         General: Normal range of motion.      Cervical back: Normal range of motion and neck supple.      Right lower leg: No edema.      Left lower leg: No edema.   Skin:     General: Skin is warm and dry.      Coloration: Skin is not jaundiced or pale.      Findings: No erythema or rash.   Neurological:      General: No focal deficit present.      Mental Status: He is alert and oriented to person, place, and time.   Psychiatric:         Mood and Affect: Mood normal.         Behavior: Behavior normal.       RLE: 2+ Femoral pulse palpated. No wounds or skin lesions.   LLE: Femoral pulse palpated, 2+ DP pulse, PT pulse palpated. No skin wounds or lesions.     III. ASSESSMENT & PLAN (MEDICAL DECISION MAKING)     1. Atherosclerosis of native arteries of extremities with intermittent claudication, right leg    2. Right leg claudication    3. Calcification of artery        Imaging Results: (I have personally reviewed the images/studies and provided my interpretation below)    ANGELA 12/11/23:  R L   0.66 in PT; 0.65 in DP  0.90 in PT; 0.99 in DP      9/21/23 RLE arterial duplex:  Likely occlusion in distal SFA with change from triphasic in SFA to monophasic popliteal waveforms. Distal tibial  waveforms are biphasic suggesting robust collaterals.    Assessment/Diagnosis and Plan:  68 y.o. male with right leg claudication.  We had an extensive discussion regarding the treatment of claudication.  At this time his claudication symptoms only mildly limit his activities mainly during brisk walking and long distance walking.  We discussed risks and benefits of surgical treatment.  Since the patient is only mildly limited by his claudication symptoms he would like to forego any surgical treatment at this time and will return in 6 months to reassess his symptoms.    -Encouraged the patient to continue exercising as tolerated   -Tobacco cessation and the negative effect of tobacco as it pertains to vascular health was explained to the patient  -ASA 81mg daily recommended for all patients with PAD  -High intensity statin (atorvastatin 40mg or rosuvastatin 20mg) recommended for all patients with symptomatic PAD   -Control of atherosclerotic risk factors: Goal LDL <100, Goal HbA1C <7, Goal BP <140/90  -Follow up 6 months for symptom reassessment    RADHA Bcaa II, MD, RPVI  Vascular Surgeon  [facility]

## 2023-12-18 RX ORDER — ASPIRIN 81 MG/1
81 TABLET ORAL DAILY
COMMUNITY
Start: 2023-12-18

## 2023-12-20 ENCOUNTER — OFFICE VISIT (OUTPATIENT)
Dept: PRIMARY CARE CLINIC | Facility: CLINIC | Age: 68
End: 2023-12-20
Payer: MEDICARE

## 2023-12-20 VITALS
DIASTOLIC BLOOD PRESSURE: 68 MMHG | OXYGEN SATURATION: 97 % | WEIGHT: 225.19 LBS | TEMPERATURE: 98 F | SYSTOLIC BLOOD PRESSURE: 116 MMHG | BODY MASS INDEX: 31.52 KG/M2 | HEIGHT: 71 IN | HEART RATE: 80 BPM | RESPIRATION RATE: 16 BRPM

## 2023-12-20 DIAGNOSIS — Z12.5 ENCOUNTER FOR SCREENING FOR MALIGNANT NEOPLASM OF PROSTATE: ICD-10-CM

## 2023-12-20 DIAGNOSIS — R97.20 ELEVATED PSA: ICD-10-CM

## 2023-12-20 DIAGNOSIS — R35.0 URINARY FREQUENCY: ICD-10-CM

## 2023-12-20 DIAGNOSIS — I73.9 CLAUDICATION: ICD-10-CM

## 2023-12-20 DIAGNOSIS — N39.43 POST-VOID DRIBBLING: ICD-10-CM

## 2023-12-20 DIAGNOSIS — R39.11 URINARY HESITANCY: Primary | ICD-10-CM

## 2023-12-20 PROCEDURE — 3074F SYST BP LT 130 MM HG: CPT | Mod: HCWC,CPTII,S$GLB, | Performed by: STUDENT IN AN ORGANIZED HEALTH CARE EDUCATION/TRAINING PROGRAM

## 2023-12-20 PROCEDURE — 1159F MED LIST DOCD IN RCRD: CPT | Mod: HCWC,CPTII,S$GLB, | Performed by: STUDENT IN AN ORGANIZED HEALTH CARE EDUCATION/TRAINING PROGRAM

## 2023-12-20 PROCEDURE — 99214 OFFICE O/P EST MOD 30 MIN: CPT | Mod: HCWC,S$GLB,, | Performed by: STUDENT IN AN ORGANIZED HEALTH CARE EDUCATION/TRAINING PROGRAM

## 2023-12-20 PROCEDURE — 1159F PR MEDICATION LIST DOCUMENTED IN MEDICAL RECORD: ICD-10-PCS | Mod: HCWC,CPTII,S$GLB, | Performed by: STUDENT IN AN ORGANIZED HEALTH CARE EDUCATION/TRAINING PROGRAM

## 2023-12-20 PROCEDURE — 3008F PR BODY MASS INDEX (BMI) DOCUMENTED: ICD-10-PCS | Mod: HCWC,CPTII,S$GLB, | Performed by: STUDENT IN AN ORGANIZED HEALTH CARE EDUCATION/TRAINING PROGRAM

## 2023-12-20 PROCEDURE — 3008F BODY MASS INDEX DOCD: CPT | Mod: HCWC,CPTII,S$GLB, | Performed by: STUDENT IN AN ORGANIZED HEALTH CARE EDUCATION/TRAINING PROGRAM

## 2023-12-20 PROCEDURE — 3044F PR MOST RECENT HEMOGLOBIN A1C LEVEL <7.0%: ICD-10-PCS | Mod: HCWC,CPTII,S$GLB, | Performed by: STUDENT IN AN ORGANIZED HEALTH CARE EDUCATION/TRAINING PROGRAM

## 2023-12-20 PROCEDURE — 1126F PR PAIN SEVERITY QUANTIFIED, NO PAIN PRESENT: ICD-10-PCS | Mod: HCWC,CPTII,S$GLB, | Performed by: STUDENT IN AN ORGANIZED HEALTH CARE EDUCATION/TRAINING PROGRAM

## 2023-12-20 PROCEDURE — 3288F PR FALLS RISK ASSESSMENT DOCUMENTED: ICD-10-PCS | Mod: HCWC,CPTII,S$GLB, | Performed by: STUDENT IN AN ORGANIZED HEALTH CARE EDUCATION/TRAINING PROGRAM

## 2023-12-20 PROCEDURE — 1101F PR PT FALLS ASSESS DOC 0-1 FALLS W/OUT INJ PAST YR: ICD-10-PCS | Mod: HCWC,CPTII,S$GLB, | Performed by: STUDENT IN AN ORGANIZED HEALTH CARE EDUCATION/TRAINING PROGRAM

## 2023-12-20 PROCEDURE — 3044F HG A1C LEVEL LT 7.0%: CPT | Mod: HCWC,CPTII,S$GLB, | Performed by: STUDENT IN AN ORGANIZED HEALTH CARE EDUCATION/TRAINING PROGRAM

## 2023-12-20 PROCEDURE — 99214 PR OFFICE/OUTPT VISIT, EST, LEVL IV, 30-39 MIN: ICD-10-PCS | Mod: HCWC,S$GLB,, | Performed by: STUDENT IN AN ORGANIZED HEALTH CARE EDUCATION/TRAINING PROGRAM

## 2023-12-20 PROCEDURE — 3078F PR MOST RECENT DIASTOLIC BLOOD PRESSURE < 80 MM HG: ICD-10-PCS | Mod: HCWC,CPTII,S$GLB, | Performed by: STUDENT IN AN ORGANIZED HEALTH CARE EDUCATION/TRAINING PROGRAM

## 2023-12-20 PROCEDURE — 99999 PR PBB SHADOW E&M-EST. PATIENT-LVL IV: ICD-10-PCS | Mod: PBBFAC,HCWC,, | Performed by: STUDENT IN AN ORGANIZED HEALTH CARE EDUCATION/TRAINING PROGRAM

## 2023-12-20 PROCEDURE — 3288F FALL RISK ASSESSMENT DOCD: CPT | Mod: HCWC,CPTII,S$GLB, | Performed by: STUDENT IN AN ORGANIZED HEALTH CARE EDUCATION/TRAINING PROGRAM

## 2023-12-20 PROCEDURE — 99999 PR PBB SHADOW E&M-EST. PATIENT-LVL IV: CPT | Mod: PBBFAC,HCWC,, | Performed by: STUDENT IN AN ORGANIZED HEALTH CARE EDUCATION/TRAINING PROGRAM

## 2023-12-20 PROCEDURE — 1101F PT FALLS ASSESS-DOCD LE1/YR: CPT | Mod: HCWC,CPTII,S$GLB, | Performed by: STUDENT IN AN ORGANIZED HEALTH CARE EDUCATION/TRAINING PROGRAM

## 2023-12-20 PROCEDURE — 1160F RVW MEDS BY RX/DR IN RCRD: CPT | Mod: HCWC,CPTII,S$GLB, | Performed by: STUDENT IN AN ORGANIZED HEALTH CARE EDUCATION/TRAINING PROGRAM

## 2023-12-20 PROCEDURE — 3074F PR MOST RECENT SYSTOLIC BLOOD PRESSURE < 130 MM HG: ICD-10-PCS | Mod: HCWC,CPTII,S$GLB, | Performed by: STUDENT IN AN ORGANIZED HEALTH CARE EDUCATION/TRAINING PROGRAM

## 2023-12-20 PROCEDURE — 1126F AMNT PAIN NOTED NONE PRSNT: CPT | Mod: HCWC,CPTII,S$GLB, | Performed by: STUDENT IN AN ORGANIZED HEALTH CARE EDUCATION/TRAINING PROGRAM

## 2023-12-20 PROCEDURE — 1160F PR REVIEW ALL MEDS BY PRESCRIBER/CLIN PHARMACIST DOCUMENTED: ICD-10-PCS | Mod: HCWC,CPTII,S$GLB, | Performed by: STUDENT IN AN ORGANIZED HEALTH CARE EDUCATION/TRAINING PROGRAM

## 2023-12-20 PROCEDURE — 3078F DIAST BP <80 MM HG: CPT | Mod: HCWC,CPTII,S$GLB, | Performed by: STUDENT IN AN ORGANIZED HEALTH CARE EDUCATION/TRAINING PROGRAM

## 2023-12-20 NOTE — PATIENT INSTRUCTIONS
Urinary Frequency:   - has been taking Flomax to help with voiding.       Claudication:   - patient saw vascular surgery last week, ankle-brachial indices showed normal flow on left leg, but right leg was around 66%.  Was advised to take low-dose aspirin, take high-dose statin, participate in regular exercise.   - patient has former smoker, not current.   - patient states he bikes for 1-2 hours daily.  His HDL reflects this with very healthy 59 and 61 on recent checks.   - patient does state he gets some pains to right leg at times.   - vascular advise patient is not advised to stenting at this time, but would discuss further at follow-up in 1 year.    Elevated LDL:   - patient had very elevated LDL on earlier check, started on statin in room recheck significantly improved from LDL around 300 to near 130.    Elevated PSA:   - patient had PSA of 4.1, on recheck in 6-12 months had increase to 4.3.   - patient does have some urinary frequency, and possible decreased urinary stream.   - will recheck in 6-12 months, if continuing to increase will have patient see Urology.

## 2023-12-20 NOTE — PROGRESS NOTES
Subjective:           Patient ID: Nigel Chan is a 68 y.o. male who presents today with a chief complaint of Follow-up (Urinary issues & labs)  .    Chief Complaint:   Follow-up (Urinary issues & labs)      History of Present Illness:    Nigel Chan is a 68 y.o. male who presents today with a chief complaint of Follow-up (Urinary issues & labs)  .    67yo male having urinary frequency.    Is constant issues through day, can get to be every 15 minutes.  Has been using Flomax 0.8mg daily.     Does not drink overnight, but then drinks 3-4 bottles of water in AM before noon.    Does notice his urine being yellow.  Darker after drinking coffee and had an odor.  After vitamins can also be a bit darker.  Later in the day will be lighter.      Wife states having some intermittent stream issues.  Will go/stop when voiding over night.         Review of Systems   Constitutional: Negative.  Negative for fatigue, fever and unexpected weight change.   HENT: Negative.  Negative for congestion, sinus pressure, sinus pain and sneezing.    Eyes: Negative.    Respiratory: Negative.  Negative for cough, shortness of breath and wheezing.    Cardiovascular: Negative.  Negative for chest pain, palpitations and leg swelling.   Gastrointestinal: Negative.  Negative for abdominal distention, constipation, diarrhea, nausea and vomiting.   Endocrine: Negative.    Genitourinary:  Positive for decreased urine volume, difficulty urinating and frequency. Negative for urgency.   Musculoskeletal: Negative.  Negative for arthralgias and back pain.   Skin: Negative.  Negative for rash.   Allergic/Immunologic: Negative for food allergies.   Neurological:  Negative for weakness, light-headedness, numbness and headaches.   Psychiatric/Behavioral: Negative.  Negative for sleep disturbance.            Objective:        Vitals:    12/20/23 1400 12/20/23 1435   BP: 138/68 116/68   BP Location: Right arm Right arm   Patient Position: Sitting  "Sitting   BP Method: Medium (Manual) Large (Manual)   Pulse: 80    Resp: 16    Temp: 97.9 °F (36.6 °C)    TempSrc: Oral    SpO2: 97%    Weight: 102.2 kg (225 lb 3.2 oz)    Height: 5' 11" (1.803 m)        Body mass index is 31.41 kg/m².      Physical Exam  Vitals reviewed.   Constitutional:       General: He is not in acute distress.     Appearance: Normal appearance. He is obese. He is not ill-appearing.      Comments: As per BMI.   HENT:      Head: Normocephalic and atraumatic.      Right Ear: Tympanic membrane and external ear normal.      Left Ear: Tympanic membrane and external ear normal.      Nose: Nose normal. No rhinorrhea.   Eyes:      Extraocular Movements: Extraocular movements intact.      Conjunctiva/sclera: Conjunctivae normal.   Cardiovascular:      Rate and Rhythm: Normal rate and regular rhythm.      Heart sounds: No murmur heard.  Pulmonary:      Effort: Pulmonary effort is normal. No respiratory distress.      Breath sounds: No wheezing.   Abdominal:      Tenderness: There is no right CVA tenderness.   Musculoskeletal:         General: No swelling or deformity.      Cervical back: Normal range of motion.      Right lower leg: No edema.      Left lower leg: No edema.   Skin:     Capillary Refill: Capillary refill takes less than 2 seconds.   Neurological:      General: No focal deficit present.      Mental Status: He is alert and oriented to person, place, and time.      Gait: Gait normal.   Psychiatric:         Mood and Affect: Mood normal.             Lab Results   Component Value Date     02/01/2023    K 4.0 02/01/2023     02/01/2023    CO2 26 02/01/2023    BUN 11 02/01/2023    CREATININE 1.3 02/01/2023    ANIONGAP 10 02/01/2023     Lab Results   Component Value Date    HGBA1C 6.1 (H) 09/21/2023     No results found for: "BNP", "BNPTRIAGEBLO"    Lab Results   Component Value Date    WBC 4.54 02/01/2023    HGB 14.6 02/01/2023    HCT 44.0 02/01/2023     02/01/2023    GRAN 2.4 " 02/01/2023    GRAN 53.8 02/01/2023     Lab Results   Component Value Date    CHOL 211 (H) 09/21/2023    HDL 59 09/21/2023    LDLCALC 137.4 09/21/2023    TRIG 73 09/21/2023          Current Outpatient Medications:     aspirin (ECOTRIN) 81 MG EC tablet, Take 1 tablet (81 mg total) by mouth once daily., Disp: , Rfl:     atorvastatin (LIPITOR) 80 MG tablet, Take 1 tablet (80 mg total) by mouth once daily., Disp: 90 tablet, Rfl: 3    tamsulosin (FLOMAX) 0.4 mg Cap, Take 2 capsules (0.8 mg total) by mouth once daily., Disp: 180 capsule, Rfl: 3     Outpatient Encounter Medications as of 12/20/2023   Medication Sig Dispense Refill    aspirin (ECOTRIN) 81 MG EC tablet Take 1 tablet (81 mg total) by mouth once daily.      atorvastatin (LIPITOR) 80 MG tablet Take 1 tablet (80 mg total) by mouth once daily. 90 tablet 3    tamsulosin (FLOMAX) 0.4 mg Cap Take 2 capsules (0.8 mg total) by mouth once daily. 180 capsule 3     No facility-administered encounter medications on file as of 12/20/2023.          Assessment:       1. Urinary hesitancy    2. Urinary frequency    3. Post-void dribbling    4. Elevated PSA    5. Encounter for screening for malignant neoplasm of prostate    6. Claudication           Plan:       Urinary hesitancy  -     PSA, Screening; Future; Expected date: 03/20/2024    Urinary frequency    Post-void dribbling  -     PSA, Screening; Future; Expected date: 03/20/2024    Elevated PSA  -     PSA, Screening; Future; Expected date: 03/20/2024    Encounter for screening for malignant neoplasm of prostate  -     PSA, Screening; Future; Expected date: 03/20/2024    Claudication  Comments:  right leg shows decrease pressure on ZURI to right side.               Urinary Frequency:   - has been taking Flomax to help with voiding.       Claudication:   - patient saw vascular surgery last week, ankle-brachial indices showed normal flow on left leg, but right leg was around 66%.  Was advised to take low-dose aspirin, take  high-dose statin, participate in regular exercise.   - patient has former smoker, not current.   - patient states he bikes for 1-2 hours daily.  His HDL reflects this with very healthy 59 and 61 on recent checks.   - patient does state he gets some pains to right leg at times.   - vascular advise patient is not advised to stenting at this time, but would discuss further at follow-up in 1 year.    Elevated LDL:   - patient had very elevated LDL on earlier check, started on statin in room recheck significantly improved from LDL around 300 to near 130.    Elevated PSA:   - patient had PSA of 4.1, on recheck in 6-12 months had increase to 4.3.   - patient does have some urinary frequency, and possible decreased urinary stream.   - will recheck in 6-12 months, if continuing to increase will have patient see Urology.

## 2024-04-23 DIAGNOSIS — E78.5 HYPERLIPIDEMIA, UNSPECIFIED HYPERLIPIDEMIA TYPE: ICD-10-CM

## 2024-04-23 DIAGNOSIS — R97.20 ELEVATED PSA: ICD-10-CM

## 2024-04-23 DIAGNOSIS — R73.03 PREDIABETES: ICD-10-CM

## 2024-04-23 RX ORDER — ATORVASTATIN CALCIUM 80 MG/1
80 TABLET, FILM COATED ORAL DAILY
Qty: 90 TABLET | Refills: 3 | Status: SHIPPED | OUTPATIENT
Start: 2024-04-23

## 2024-04-23 RX ORDER — TAMSULOSIN HYDROCHLORIDE 0.4 MG/1
0.8 CAPSULE ORAL DAILY
Qty: 180 CAPSULE | Refills: 2 | Status: SHIPPED | OUTPATIENT
Start: 2024-04-23

## 2024-04-23 NOTE — TELEPHONE ENCOUNTER
Refill Routing Note   Medication(s) are not appropriate for processing by [facility] for the following reason(s):        Required labs outdated: CMP    [facility] action(s):  Defer  Approve     Requires labs : Yes             Appointments  past 12m or future 3m with PCP    Date Provider   Last Visit   12/20/2023 Guillermo Philippe MD   Next Visit   Visit date not found Guillermo Philippe MD   ED visits in past 90 days: 0        Note composed:5:38 PM 04/23/2024

## 2024-04-23 NOTE — TELEPHONE ENCOUNTER
----- Message from Elly Nunez sent at 4/22/2024  2:18 PM CDT -----  Contact: [phone]  Requesting an RX refill or new RX.  Is this a refill or new RX: Refill 1  RX name and strength (copy/paste from chart):  tamsulosin (FLOMAX) 0.4 mg Cap  Is this a 30 day or 90 day RX:   Pharmacy name and phone # (copy/paste from chart):  [facility] - [address], [place] [address]     Requesting an RX refill or new RX.  Is this a refill or new RX: Refill 2  RX name and strength (copy/paste from chart):  atorvastatin (LIPITOR) 80 MG tablet  Is this a 30 day or 90 day RX:   Pharmacy name and phone # (copy/paste from chart):  [facility] - [address], [place] [address]         The doctors have asked that we provide their patients with the following 2 reminders -- prescription refills can take up to 72 hours, and a friendly reminder that in the future you can use your [facility] account to request refills:

## 2024-04-23 NOTE — TELEPHONE ENCOUNTER
Care Due:                  Date            Visit Type   Department     Provider  --------------------------------------------------------------------------------                                EP -                              PRIMARY      [facility]  Last Visit: 12-      CARE ([facility])   PRIMARY CARE   Guillermo Philippe  Next Visit: None Scheduled  None         None Found                                                            Last  Test          Frequency    Reason                     Performed    Due Date  --------------------------------------------------------------------------------    CMP.........  12 months..  atorvastatin.............  02- 01-    [facility] Embedded Care Due Messages. Reference number: [id number].   4/23/2024 8:26:12 AM CDT

## 2024-07-15 ENCOUNTER — TELEPHONE (OUTPATIENT)
Dept: PRIMARY CARE CLINIC | Facility: CLINIC | Age: 69
End: 2024-07-15
Payer: MEDICARE

## 2024-07-15 NOTE — TELEPHONE ENCOUNTER
----- Message from Zahraa Tamayo sent at 7/15/2024  3:07 PM CDT -----  Contact: [phone]  2TESTRESULTS    Type: Test Results    What test was performed? lab    Who ordered the test? Dr Philippe     When and where were the test performed? 06/20 at [facility]     Would you like a call back and or thru [facility]: call back     Comments:

## 2024-07-15 NOTE — TELEPHONE ENCOUNTER
Gave patient PSA results.  When do you want to see him back for a follow up.  It didn't state in last visit.

## 2024-07-22 ENCOUNTER — TELEPHONE (OUTPATIENT)
Dept: PRIMARY CARE CLINIC | Facility: CLINIC | Age: 69
End: 2024-07-22
Payer: MEDICARE

## 2024-07-22 DIAGNOSIS — F17.210 CIGARETTE SMOKER: Primary | ICD-10-CM

## 2024-07-22 NOTE — TELEPHONE ENCOUNTER
----- Message from Coni Gonzalez MA sent at 8/9/2023  9:27 AM CDT -----  Regarding: repeat lung CT  Repeat low dose lung CT scan

## 2024-07-26 ENCOUNTER — TELEPHONE (OUTPATIENT)
Dept: PULMONOLOGY | Facility: CLINIC | Age: 69
End: 2024-07-26
Payer: MEDICARE

## 2024-09-20 ENCOUNTER — TELEPHONE (OUTPATIENT)
Dept: PULMONOLOGY | Facility: CLINIC | Age: 69
End: 2024-09-20
Payer: MEDICARE

## 2024-12-31 ENCOUNTER — OFFICE VISIT (OUTPATIENT)
Dept: INTERNAL MEDICINE | Facility: CLINIC | Age: 69
End: 2024-12-31
Payer: MEDICARE

## 2024-12-31 VITALS
OXYGEN SATURATION: 96 % | TEMPERATURE: 97 F | SYSTOLIC BLOOD PRESSURE: 160 MMHG | BODY MASS INDEX: 30.39 KG/M2 | HEIGHT: 71 IN | DIASTOLIC BLOOD PRESSURE: 76 MMHG | HEART RATE: 76 BPM | WEIGHT: 217.06 LBS

## 2024-12-31 DIAGNOSIS — Z00.00 ENCOUNTER FOR ANNUAL HEALTH EXAMINATION: Primary | ICD-10-CM

## 2024-12-31 DIAGNOSIS — R03.0 ELEVATED BLOOD PRESSURE READING IN OFFICE WITHOUT DIAGNOSIS OF HYPERTENSION: ICD-10-CM

## 2024-12-31 DIAGNOSIS — R97.20 ELEVATED PSA: ICD-10-CM

## 2024-12-31 DIAGNOSIS — R73.03 PREDIABETES: ICD-10-CM

## 2024-12-31 DIAGNOSIS — G47.10 HYPERSOMNIA: ICD-10-CM

## 2024-12-31 DIAGNOSIS — I49.9 IRREGULAR HEART RATE: ICD-10-CM

## 2024-12-31 DIAGNOSIS — E78.5 HYPERLIPIDEMIA, UNSPECIFIED HYPERLIPIDEMIA TYPE: ICD-10-CM

## 2024-12-31 DIAGNOSIS — J43.9 PULMONARY EMPHYSEMA, UNSPECIFIED EMPHYSEMA TYPE: ICD-10-CM

## 2024-12-31 DIAGNOSIS — Z00.00 ENCOUNTER FOR PREVENTIVE HEALTH EXAMINATION: ICD-10-CM

## 2024-12-31 DIAGNOSIS — I49.3 ASYMPTOMATIC PVCS: ICD-10-CM

## 2024-12-31 DIAGNOSIS — I70.0 AORTIC ATHEROSCLEROSIS: ICD-10-CM

## 2024-12-31 LAB
OHS QRS DURATION: 94 MS
OHS QTC CALCULATION: 430 MS

## 2024-12-31 PROCEDURE — 1158F ADVNC CARE PLAN TLK DOCD: CPT | Mod: HCNC,CPTII,S$GLB, | Performed by: NURSE PRACTITIONER

## 2024-12-31 PROCEDURE — 1101F PT FALLS ASSESS-DOCD LE1/YR: CPT | Mod: HCNC,CPTII,S$GLB, | Performed by: NURSE PRACTITIONER

## 2024-12-31 PROCEDURE — 1125F AMNT PAIN NOTED PAIN PRSNT: CPT | Mod: HCNC,CPTII,S$GLB, | Performed by: NURSE PRACTITIONER

## 2024-12-31 PROCEDURE — 1159F MED LIST DOCD IN RCRD: CPT | Mod: HCNC,CPTII,S$GLB, | Performed by: NURSE PRACTITIONER

## 2024-12-31 PROCEDURE — 93005 ELECTROCARDIOGRAM TRACING: CPT | Mod: HCNC,S$GLB,, | Performed by: NURSE PRACTITIONER

## 2024-12-31 PROCEDURE — 3288F FALL RISK ASSESSMENT DOCD: CPT | Mod: HCNC,CPTII,S$GLB, | Performed by: NURSE PRACTITIONER

## 2024-12-31 PROCEDURE — 1160F RVW MEDS BY RX/DR IN RCRD: CPT | Mod: HCNC,CPTII,S$GLB, | Performed by: NURSE PRACTITIONER

## 2024-12-31 PROCEDURE — 3077F SYST BP >= 140 MM HG: CPT | Mod: HCNC,CPTII,S$GLB, | Performed by: NURSE PRACTITIONER

## 2024-12-31 PROCEDURE — 1170F FXNL STATUS ASSESSED: CPT | Mod: HCNC,CPTII,S$GLB, | Performed by: NURSE PRACTITIONER

## 2024-12-31 PROCEDURE — 3078F DIAST BP <80 MM HG: CPT | Mod: HCNC,CPTII,S$GLB, | Performed by: NURSE PRACTITIONER

## 2024-12-31 PROCEDURE — 93010 ELECTROCARDIOGRAM REPORT: CPT | Mod: HCNC,S$GLB,, | Performed by: INTERNAL MEDICINE

## 2024-12-31 PROCEDURE — G0438 PPPS, INITIAL VISIT: HCPCS | Mod: HCNC,S$GLB,, | Performed by: NURSE PRACTITIONER

## 2024-12-31 PROCEDURE — 99999 PR PBB SHADOW E&M-EST. PATIENT-LVL IV: CPT | Mod: PBBFAC,HCNC,, | Performed by: NURSE PRACTITIONER

## 2024-12-31 NOTE — PATIENT INSTRUCTIONS
Counseling and Referral of Other Preventative  (Italic type indicates deductible and co-insurance are waived)    Patient Name: Nigel Chan  Today's Date: 12/31/2024    Health Maintenance       Date Due Completion Date    TETANUS VACCINE Never done ---    Pneumococcal Vaccines (Age 50+) (1 of 2 - PCV) Never done ---    Shingles Vaccine (1 of 2) Never done ---    RSV Vaccine (Age 60+ and Pregnant patients) (1 - Risk 60-74 years 1-dose series) Never done ---    Influenza Vaccine (1) 09/01/2024 10/15/2013    COVID-19 Vaccine (1 - 2024-25 season) Never done ---    Hemoglobin A1c (Prediabetes) 09/21/2024 9/21/2023    High Dose Statin 04/23/2025 4/23/2024    Colorectal Cancer Screening 02/01/2026 2/1/2023    Lipid Panel 09/21/2028 9/21/2023        Orders Placed This Encounter   Procedures    Ambulatory referral/consult to Sleep Disorders    Ambulatory Referral/Consult to Enhanced Annual Wellness Visit (eAWV)    IN OFFICE EKG 12-LEAD (to Fan)       The following information is provided to all patients.  This information is to help you find resources for any of the problems found today that may be affecting your health:                  Living healthy guide: www.[facility].[place].gov      Understanding Diabetes: www.diabetes.org      Eating healthy: www.cdc.gov/healthyweight      [facility] home safety checklist: www.cdc.gov/steadi/patient.html      Agency on Aging: www.goea.[place].[facility]      Alcoholics anonymous (AA): www.aa.org      Physical Activity: www.dayana.nih.gov/em0uidc      Tobacco use: www.quitwithusla.org         Counseling and Referral of Other Preventative  (Italic type indicates deductible and co-insurance are waived)    Patient Name: Nigel Chan  Today's Date: 12/31/2024    Health Maintenance       Date Due Completion Date    TETANUS VACCINE Never done ---    Pneumococcal Vaccines (Age 50+) (1 of 2 - PCV) Never done ---    Shingles Vaccine (1 of 2) Never done ---    RSV Vaccine (Age 60+ and Pregnant patients) (1  - Risk 60-74 years 1-dose series) Never done ---    Influenza Vaccine (1) 09/01/2024 10/15/2013    COVID-19 Vaccine (1 - 2024-25 season) Never done ---    Hemoglobin A1c (Prediabetes) 09/21/2024 9/21/2023    High Dose Statin 04/23/2025 4/23/2024    Colorectal Cancer Screening 02/01/2026 2/1/2023    Lipid Panel 09/21/2028 9/21/2023        Orders Placed This Encounter   Procedures    Ambulatory referral/consult to Sleep Disorders    Ambulatory Referral/Consult to Enhanced Annual Wellness Visit (eAWV)    IN OFFICE EKG 12-LEAD (to Fan)       The following information is provided to all patients.  This information is to help you find resources for any of the problems found today that may be affecting your health:                  Living healthy guide: www.[facility].[place].[facility]      Understanding Diabetes: www.diabetes.org      Eating healthy: www.cdc.gov/healthyweight      [facility] home safety checklist: www.cdc.gov/steadi/patient.html      Agency on Aging: www.goea.[place].[facility]      Alcoholics anonymous (AA): www.aa.org      Physical Activity: www.dayana.nih.gov/uc2mnzw      Tobacco use: www.quitwithusla.org

## 2024-12-31 NOTE — PROGRESS NOTES
"  iNgel Chan presented for an initial Medicare AWV today. The following components were reviewed and updated:    Medical history  Family History  Social history  Allergies and Current Medications  Health Risk Assessment  Health Maintenance  Care Team    **See Completed Assessments for Annual Wellness visit with in the encounter summary    The following assessments were completed:  Depression Screening  Cognitive function Screening  Timed Get Up Test  Whisper Test      Opioid documentation:    Patient does not have a current opioid prescription.      Vitals:    12/31/24 0936   BP: (!) 160/76   BP Location: Left arm   Patient Position: Sitting   Pulse: 76   Temp: 97.4 °F (36.3 °C)   TempSrc: Temporal   SpO2: 96%   Weight: 98.5 kg (217 lb 0.7 oz)   Height: 5' 11" (1.803 m)     Body mass index is 30.27 kg/m².     Physical Exam  Vitals reviewed.   Constitutional:       Appearance: Normal appearance.   HENT:      Head: Normocephalic.   Eyes:      Extraocular Movements: Extraocular movements intact.      Pupils: Pupils are equal, round, and reactive to light.   Cardiovascular:      Rate and Rhythm: Normal rate. Rhythm regularly irregular.   Pulmonary:      Effort: Pulmonary effort is normal.      Breath sounds: Normal breath sounds.   Abdominal:      General: Abdomen is flat. Bowel sounds are normal.      Palpations: Abdomen is soft.   Musculoskeletal:         General: Normal range of motion.      Cervical back: Normal range of motion.   Skin:     General: Skin is warm and dry.   Neurological:      General: No focal deficit present.      Mental Status: He is alert.   Psychiatric:         Mood and Affect: Mood normal.       Diagnoses and health risks identified today and associated recommendations/orders:  1. Encounter for annual health examination  - Ambulatory Referral/Consult to Enhanced Annual Wellness Visit (eAWV); Future    2. Hyperlipidemia, unspecified hyperlipidemia type  6. Aortic atherosclerosis  -Chronic, " stable. Managed by PCP. Continue with atorvastatin    3. Elevated PSA  -Chronic, stable. Managed by PCP. Continue with Tamsulosin    4. Prediabetes  -Chronic, stable. Managed by PCP. F/u with PCP on 1/16/25 for repeat labs    5. Pulmonary emphysema, unspecified emphysema type  -Chronic, stable. Managed by PCP. No acute issues. Remains tobacco free    7. Hypersomnia  -Reported symptoms consistently with sleep apnea. Referral placed for further work up.   -Discussed pathophysiology and need to evaluation and tx if warranted.   - Ambulatory referral/consult to Sleep Disorders; Future    8. Irregular heart rate    10. Asymptomatic PVCs  -hx of PVC's. Repeat EKG reflective of such. Pt remains asymptomatic  - IN OFFICE EKG 12-LEAD (to Muse)    9. Elevated blood pressure reading in office without diagnosis of hypertension  -BP elevated in office. Recommend monitoring BP BID for the next 2 weeks.   -Present log to PCP who will render tx as warranted.     Provided Nigel with a 5-10 year written screening schedule and personal prevention plan. Recommendations were developed using the USPSTF age appropriate recommendations. Education, counseling, and referrals were provided as needed.  After Visit Summary printed and given to patient which includes a list of additional screenings\tests needed.    No follow-ups on file.      Amor Rivero NP    I offered to discuss advanced care planning, including how to pick a person who would make decisions for you if you were unable to make them for yourself, called a health care power of , and what kind of decisions you might make such as use of life sustaining treatments such as ventilators and tube feeding when faced with a life limiting illness recorded on a living will that they will need to know. (How you want to be cared for as you near the end of your natural life)     X Patient is interested in learning more about how to make advanced directives.  I provided them paperwork  and offered to discuss this with them.

## 2025-01-16 ENCOUNTER — OFFICE VISIT (OUTPATIENT)
Dept: PRIMARY CARE CLINIC | Facility: CLINIC | Age: 70
End: 2025-01-16
Payer: MEDICARE

## 2025-01-16 VITALS
WEIGHT: 219.44 LBS | HEART RATE: 71 BPM | RESPIRATION RATE: 16 BRPM | HEIGHT: 71 IN | BODY MASS INDEX: 30.72 KG/M2 | SYSTOLIC BLOOD PRESSURE: 155 MMHG | DIASTOLIC BLOOD PRESSURE: 73 MMHG | TEMPERATURE: 98 F

## 2025-01-16 DIAGNOSIS — R97.20 ELEVATED PSA: ICD-10-CM

## 2025-01-16 DIAGNOSIS — I10 HYPERTENSION, UNSPECIFIED TYPE: ICD-10-CM

## 2025-01-16 DIAGNOSIS — N40.1 BPH ASSOCIATED WITH NOCTURIA: ICD-10-CM

## 2025-01-16 DIAGNOSIS — Z00.00 ANNUAL PHYSICAL EXAM: Primary | ICD-10-CM

## 2025-01-16 DIAGNOSIS — F17.210 CIGARETTE SMOKER: ICD-10-CM

## 2025-01-16 DIAGNOSIS — R35.1 BPH ASSOCIATED WITH NOCTURIA: ICD-10-CM

## 2025-01-16 DIAGNOSIS — R73.03 PREDIABETES: ICD-10-CM

## 2025-01-16 DIAGNOSIS — E78.5 HYPERLIPIDEMIA, UNSPECIFIED HYPERLIPIDEMIA TYPE: ICD-10-CM

## 2025-01-16 PROBLEM — J43.9 PULMONARY EMPHYSEMA, UNSPECIFIED EMPHYSEMA TYPE: Status: RESOLVED | Noted: 2023-09-21 | Resolved: 2025-01-16

## 2025-01-16 PROCEDURE — 3288F FALL RISK ASSESSMENT DOCD: CPT | Mod: HCNC,CPTII,S$GLB, | Performed by: STUDENT IN AN ORGANIZED HEALTH CARE EDUCATION/TRAINING PROGRAM

## 2025-01-16 PROCEDURE — 3078F DIAST BP <80 MM HG: CPT | Mod: HCNC,CPTII,S$GLB, | Performed by: STUDENT IN AN ORGANIZED HEALTH CARE EDUCATION/TRAINING PROGRAM

## 2025-01-16 PROCEDURE — 3077F SYST BP >= 140 MM HG: CPT | Mod: HCNC,CPTII,S$GLB, | Performed by: STUDENT IN AN ORGANIZED HEALTH CARE EDUCATION/TRAINING PROGRAM

## 2025-01-16 PROCEDURE — 1160F RVW MEDS BY RX/DR IN RCRD: CPT | Mod: HCNC,CPTII,S$GLB, | Performed by: STUDENT IN AN ORGANIZED HEALTH CARE EDUCATION/TRAINING PROGRAM

## 2025-01-16 PROCEDURE — 1159F MED LIST DOCD IN RCRD: CPT | Mod: HCNC,CPTII,S$GLB, | Performed by: STUDENT IN AN ORGANIZED HEALTH CARE EDUCATION/TRAINING PROGRAM

## 2025-01-16 PROCEDURE — 3008F BODY MASS INDEX DOCD: CPT | Mod: HCNC,CPTII,S$GLB, | Performed by: STUDENT IN AN ORGANIZED HEALTH CARE EDUCATION/TRAINING PROGRAM

## 2025-01-16 PROCEDURE — 1126F AMNT PAIN NOTED NONE PRSNT: CPT | Mod: HCNC,CPTII,S$GLB, | Performed by: STUDENT IN AN ORGANIZED HEALTH CARE EDUCATION/TRAINING PROGRAM

## 2025-01-16 PROCEDURE — 99999 PR PBB SHADOW E&M-EST. PATIENT-LVL III: CPT | Mod: PBBFAC,HCNC,, | Performed by: STUDENT IN AN ORGANIZED HEALTH CARE EDUCATION/TRAINING PROGRAM

## 2025-01-16 PROCEDURE — 99214 OFFICE O/P EST MOD 30 MIN: CPT | Mod: HCNC,S$GLB,, | Performed by: STUDENT IN AN ORGANIZED HEALTH CARE EDUCATION/TRAINING PROGRAM

## 2025-01-16 PROCEDURE — 1101F PT FALLS ASSESS-DOCD LE1/YR: CPT | Mod: HCNC,CPTII,S$GLB, | Performed by: STUDENT IN AN ORGANIZED HEALTH CARE EDUCATION/TRAINING PROGRAM

## 2025-01-16 PROCEDURE — 4010F ACE/ARB THERAPY RXD/TAKEN: CPT | Mod: HCNC,CPTII,S$GLB, | Performed by: STUDENT IN AN ORGANIZED HEALTH CARE EDUCATION/TRAINING PROGRAM

## 2025-01-16 RX ORDER — VALSARTAN 80 MG/1
80 TABLET ORAL DAILY
Qty: 90 TABLET | Refills: 3 | Status: SHIPPED | OUTPATIENT
Start: 2025-01-16 | End: 2025-01-24

## 2025-01-16 NOTE — PROGRESS NOTES
Assessment:       1. Annual physical exam    2. Hypertension, unspecified type    3. Prediabetes    4. Hyperlipidemia, unspecified hyperlipidemia type    5. Elevated PSA    6. Cigarette smoker    7. BPH associated with nocturia           Plan:     Assessment & Plan    IMPRESSION:  - Elevated blood pressure (150s/70s) warrants initiation of antihypertensive therapy. Recommend Valsartan 80mg daily as initial treatment.  - Recent cholesterol improvement noted.  - A1C from 15-16 months ago was 6.1 (pre-diabetic range), due for recheck.  - PSA mildly elevated at 4.1, likely due to BPH rather than prostate cancer.  - Current Tamsulosin dose (0.8mg) may be insufficient for managing BPH symptoms.    HYPERTENSION:  - Prescribed Valsartan 80mg daily for blood pressure control.  - Scheduled follow up in 6-8 weeks to assess blood pressure control and review lab results.  - Instructed nurse to call in 7-10 days to check blood pressure readings at home.  - Noted elevated blood pressure during rooming, 150s over 70s, with previous visit showing 160/76.  - Observed consistently high systolic blood pressure, with well-controlled diastolic pressure.  - Confirmed normal kidney and liver function.  - Recommend starting blood pressure medication to control high systolic blood pressure.    HYPERLIPIDEMIA:  - Continued Atorvastatin for hyperlipidemia management.  - Ordered cholesterol panel.  - Noted improvement in cholesterol levels from previous year.    PERIPHERAL VASCULAR DISEASE:  - Continued Aspirin for peripheral vascular disease management.  - Noted patient reports pain in right calf after walking about 100 yards, consistent with claudication symptoms.  - Reviewed previous ANGELA test showing 66% in right leg.  - Considered repeating ANGELA test to assess disease progression.    BENIGN PROSTATIC HYPERPLASIA (BPH):  - Continued Tamsulosin 0.8mg daily for BPH symptoms.  - Noted PSA slightly elevated at 4.1 in June, consistent with  previous elevations.  - Observed patient reports weak urine stream and nocturia 3-4 times per night.  - Assessed PSA mildly elevated at 4.1, likely due to benign prostatic hypertrophy.  - Considered referral to urology for procedural intervention if symptoms persist.    PREDIABETES:  - Ordered A1C test.  - Explained the relationship between carbohydrate intake, blood sugar spikes, and diabetes progression.  - Discussed the differences between type 1 and type 2 diabetes.  - Educated on dietary choices to manage blood sugar, including the importance of fiber and protein.  - Explained the elevated sugar content in fruit juices and sodas, recommending alternatives.  - Noted previous A1C was 6.1% in September 2023, indicating prediabetes.  - Assessed A1C of 6.1% indicates prediabetes, with potential for progression.  - Recommend dietary changes, including increased fiber intake and protein consumption, to manage blood sugar.  - Planned to recheck A1C with current lab work.  - Reduce carbohydrate intake, especially refined sugars and starchy foods.  - Increase consumption of fiber-rich foods and proteins.  - Choose complex carbohydrates over simple ones (e.g., wheat bread instead of white bread).  - Limit intake of elevated-sugar fruits like bananas, grapes, and pineapples.  - Avoid sugary drinks, including fruit juices and regular sodas.  - Opt for zero-calorie drinks or artificially sweetened mayer when desiring flavored beverages.    LABS:  - Ordered CBC, kidney function, and liver function tests.             Annual physical exam  -     Comprehensive Metabolic Panel; Future; Expected date: 01/16/2025  -     CBC Auto Differential; Future; Expected date: 01/16/2025  -     Lipid Panel; Future; Expected date: 01/16/2025  -     Hemoglobin A1C; Future; Expected date: 01/16/2025  -     TSH; Future; Expected date: 01/16/2025    Hypertension, unspecified type  -     Comprehensive Metabolic Panel; Future; Expected date:  01/16/2025  -     CBC Auto Differential; Future; Expected date: 01/16/2025  -     Lipid Panel; Future; Expected date: 01/16/2025  -     Hemoglobin A1C; Future; Expected date: 01/16/2025  -     TSH; Future; Expected date: 01/16/2025  -     valsartan (DIOVAN) 80 MG tablet; Take 1 tablet (80 mg total) by mouth once daily.  Dispense: 90 tablet; Refill: 3    Prediabetes  -     Hemoglobin A1C; Future; Expected date: 01/16/2025    Hyperlipidemia, unspecified hyperlipidemia type    Elevated PSA  -     Ambulatory referral/consult to Urology; Future; Expected date: 01/23/2025    Cigarette smoker    BPH associated with nocturia  -     Ambulatory referral/consult to Urology; Future; Expected date: 01/23/2025                This note was generated with the assistance of ambient listening technology. Verbal consent was obtained by the patient and accompanying visitor(s) for the recording of patient appointment to facilitate this note. I attest to having reviewed and edited the generated note for accuracy, though some syntax or spelling errors may persist. Please contact the author of this note for any clarification.      Subjective:           Patient ID: Nigel Chan   Age:  69 y.o.  Sex: male     Chief Complaint:   Annual Exam      History of Present Illness:    Nigel Chan is a 69 y.o. male who presents today with a chief complaint of Annual Exam  .    History of Present Illness    CHIEF COMPLAINT:  Nigel presents today for follow-up on elevated blood pressure.    HYPERTENSION:  He has elevated blood pressure with previous reading of 160/76. He denies history of blood pressure medication use.    UROLOGIC SYMPTOMS:  He experiences lower urinary tract symptoms including nocturia 3-4 times per night and a weaker urine stream. He takes Tamsulosin 0.8 mg daily for symptom management. He denies previous urology consultations. PSA was 4.1 in June, consistent with measurements from the past year and September of previous  "year.    PRE-DIABETES:  A1C was 6.1 in September 2023, indicating pre-diabetes. He reports high carbohydrate intake, consuming red beans, black beans, and padron beans. He is attempting to incorporate more fish, turkey, and chicken into his diet.    HYPERLIPIDEMIA:  He reports improvement in cholesterol levels compared to previous year's results.      ROS:  General: -change in weight  Genitourinary: +nocturia           Review of Systems        Objective:        Vitals:    01/16/25 1040   BP: (!) 155/73   BP Location: Right arm   Patient Position: Sitting   Pulse: 71   Resp: 16   Temp: 97.7 °F (36.5 °C)   TempSrc: Temporal   Weight: 99.6 kg (219 lb 7.5 oz)   Height: 5' 11" (1.803 m)       Body mass index is 30.61 kg/m².      Physical Exam    Physical Exam    Vitals: Blood pressure is 160/76.               Past Medical History:   Diagnosis Date    Hyperlipidemia        Lab Results   Component Value Date     02/01/2023    K 4.0 02/01/2023     02/01/2023    CO2 26 02/01/2023    BUN 11 02/01/2023    CREATININE 1.3 02/01/2023    ANIONGAP 10 02/01/2023     Lab Results   Component Value Date    HGBA1C 6.1 (H) 09/21/2023     No results found for: "BNP", "BNPTRIAGEBLO"    Lab Results   Component Value Date    WBC 4.54 02/01/2023    HGB 14.6 02/01/2023    HCT 44.0 02/01/2023     02/01/2023    GRAN 2.4 02/01/2023    GRAN 53.8 02/01/2023     Lab Results   Component Value Date    CHOL 211 (H) 09/21/2023    HDL 59 09/21/2023    LDLCALC 137.4 09/21/2023    TRIG 73 09/21/2023        Outpatient Encounter Medications as of 1/16/2025   Medication Sig Dispense Refill    aspirin (ECOTRIN) 81 MG EC tablet Take 1 tablet (81 mg total) by mouth once daily.      atorvastatin (LIPITOR) 80 MG tablet Take 1 tablet (80 mg total) by mouth once daily. 90 tablet 3    tamsulosin (FLOMAX) 0.4 mg Cap Take 2 capsules (0.8 mg total) by mouth once daily. 180 capsule 2    valsartan (DIOVAN) 80 MG tablet Take 1 tablet (80 mg total) by " mouth once daily. 90 tablet 3     No facility-administered encounter medications on file as of 1/16/2025.

## 2025-01-24 ENCOUNTER — TELEPHONE (OUTPATIENT)
Dept: PRIMARY CARE CLINIC | Facility: CLINIC | Age: 70
End: 2025-01-24
Payer: MEDICARE

## 2025-01-24 RX ORDER — VALSARTAN 80 MG/1
80 TABLET ORAL DAILY
Qty: 90 TABLET | Refills: 3 | Status: SHIPPED | OUTPATIENT
Start: 2025-01-24 | End: 2026-01-24

## 2025-01-24 NOTE — TELEPHONE ENCOUNTER
----- Message from Guillermo Philippe MD sent at 1/16/2025  8:00 PM CST -----  Patient's cholesterol is stable.  Blood counts normal.  Kidney liver function normal.  Thyroid normal.    A1c is pending.

## 2025-01-27 ENCOUNTER — OFFICE VISIT (OUTPATIENT)
Dept: UROLOGY | Facility: CLINIC | Age: 70
End: 2025-01-27
Payer: MEDICARE

## 2025-01-27 VITALS
HEIGHT: 71 IN | BODY MASS INDEX: 31.06 KG/M2 | DIASTOLIC BLOOD PRESSURE: 73 MMHG | HEART RATE: 78 BPM | SYSTOLIC BLOOD PRESSURE: 123 MMHG | WEIGHT: 221.88 LBS

## 2025-01-27 DIAGNOSIS — R35.1 BPH ASSOCIATED WITH NOCTURIA: ICD-10-CM

## 2025-01-27 DIAGNOSIS — R97.20 ELEVATED PSA: ICD-10-CM

## 2025-01-27 DIAGNOSIS — N40.1 BPH ASSOCIATED WITH NOCTURIA: ICD-10-CM

## 2025-01-27 PROCEDURE — 3078F DIAST BP <80 MM HG: CPT | Mod: CPTII,S$GLB,, | Performed by: STUDENT IN AN ORGANIZED HEALTH CARE EDUCATION/TRAINING PROGRAM

## 2025-01-27 PROCEDURE — 3044F HG A1C LEVEL LT 7.0%: CPT | Mod: CPTII,S$GLB,, | Performed by: STUDENT IN AN ORGANIZED HEALTH CARE EDUCATION/TRAINING PROGRAM

## 2025-01-27 PROCEDURE — 1126F AMNT PAIN NOTED NONE PRSNT: CPT | Mod: CPTII,S$GLB,, | Performed by: STUDENT IN AN ORGANIZED HEALTH CARE EDUCATION/TRAINING PROGRAM

## 2025-01-27 PROCEDURE — 99999 PR PBB SHADOW E&M-EST. PATIENT-LVL III: CPT | Mod: PBBFAC,,, | Performed by: STUDENT IN AN ORGANIZED HEALTH CARE EDUCATION/TRAINING PROGRAM

## 2025-01-27 PROCEDURE — 99204 OFFICE O/P NEW MOD 45 MIN: CPT | Mod: S$GLB,,, | Performed by: STUDENT IN AN ORGANIZED HEALTH CARE EDUCATION/TRAINING PROGRAM

## 2025-01-27 PROCEDURE — 3288F FALL RISK ASSESSMENT DOCD: CPT | Mod: CPTII,S$GLB,, | Performed by: STUDENT IN AN ORGANIZED HEALTH CARE EDUCATION/TRAINING PROGRAM

## 2025-01-27 PROCEDURE — 1101F PT FALLS ASSESS-DOCD LE1/YR: CPT | Mod: CPTII,S$GLB,, | Performed by: STUDENT IN AN ORGANIZED HEALTH CARE EDUCATION/TRAINING PROGRAM

## 2025-01-27 PROCEDURE — 1159F MED LIST DOCD IN RCRD: CPT | Mod: CPTII,S$GLB,, | Performed by: STUDENT IN AN ORGANIZED HEALTH CARE EDUCATION/TRAINING PROGRAM

## 2025-01-27 PROCEDURE — 3008F BODY MASS INDEX DOCD: CPT | Mod: CPTII,S$GLB,, | Performed by: STUDENT IN AN ORGANIZED HEALTH CARE EDUCATION/TRAINING PROGRAM

## 2025-01-27 PROCEDURE — 4010F ACE/ARB THERAPY RXD/TAKEN: CPT | Mod: CPTII,S$GLB,, | Performed by: STUDENT IN AN ORGANIZED HEALTH CARE EDUCATION/TRAINING PROGRAM

## 2025-01-27 PROCEDURE — 3074F SYST BP LT 130 MM HG: CPT | Mod: CPTII,S$GLB,, | Performed by: STUDENT IN AN ORGANIZED HEALTH CARE EDUCATION/TRAINING PROGRAM

## 2025-01-27 NOTE — PROGRESS NOTES
"[facility] - Urology [facility] [address]   Clinic Note    SUBJECTIVE:     Chief Complaint: BPH with LUTS    History of Present Illness:  Nigel Chan is a 69 y.o. male who presents to clinic for BPH with LUTS. He is new to our clinic referred by Dr. Guillermo Philippe.     On Flomax. AUASS today - 11/3. Primarily bothered by frequency, urgency, nocturia x 3, and straining. Patient attributes this to fluid intake, not overly concerned.  Has a history of slightly elevated PSA - 4.1 in 6/2024, stable from 4.1 in 02/2023.       OBJECTIVE:     Estimated body mass index is 30.95 kg/m² as calculated from the following:    Height as of this encounter: 5' 11" (1.803 m).    Weight as of this encounter: 100.7 kg (221 lb 14.3 oz).    Vital Signs (Most Recent)  Pulse: 78 (01/27/25 1459)  BP: 123/73 (01/27/25 1459)    Physical Exam  Vitals reviewed.   Constitutional:       Appearance: Normal appearance.   HENT:      Head: Normocephalic and atraumatic.   Eyes:      Conjunctiva/sclera: Conjunctivae normal.   Cardiovascular:      Rate and Rhythm: Normal rate.   Pulmonary:      Effort: Pulmonary effort is normal.   Abdominal:      General: Abdomen is flat. There is no distension.      Tenderness: There is no abdominal tenderness.   Musculoskeletal:         General: Normal range of motion.      Cervical back: Normal range of motion.   Skin:     General: Skin is warm and dry.   Neurological:      General: No focal deficit present.      Mental Status: He is alert and oriented to person, place, and time.   Psychiatric:         Mood and Affect: Mood normal.         Behavior: Behavior normal.         Thought Content: Thought content normal.         Judgment: Judgment normal.         Lab Results   Component Value Date    BUN 18 01/16/2025    CREATININE 1.1 01/16/2025    WBC 4.61 01/16/2025    HGB 15.0 01/16/2025    HCT 45.8 01/16/2025     01/16/2025    AST 36 01/16/2025    ALT 48 (H) 01/16/2025    ALKPHOS 48 01/16/2025    ALBUMIN 4.3 " 01/16/2025    HGBA1C 6.0 (H) 01/16/2025        Lab Results   Component Value Date    PSA 4.1 (H) 06/20/2024    PSA 4.1 (H) 02/01/2023    PSADIAG 4.3 (H) 09/21/2023     ASSESSMENT     1. Elevated PSA    2. BPH associated with nocturia      PLAN:   1. Elevated PSA  -     POCT Bladder Scan  -     POCT urine dipstick without microscope  -     Ambulatory referral/consult to Urology  -     MRI Prostate W W/O Contrast; Future; Expected date: 01/27/2025    2. BPH associated with nocturia  -     Ambulatory referral/consult to Urology         Continue Flomax.  Obtain MRI prostate; if no lesions concerning for clinically significant prostate cancer, RTC 1 year with repeat PSA.    Abhay Blakely MD     Letter to Guillermo Philippe MD

## 2025-02-03 ENCOUNTER — PATIENT OUTREACH (OUTPATIENT)
Dept: ADMINISTRATIVE | Facility: HOSPITAL | Age: 70
End: 2025-02-03
Payer: MEDICARE

## 2025-02-03 VITALS — DIASTOLIC BLOOD PRESSURE: 73 MMHG | SYSTOLIC BLOOD PRESSURE: 123 MMHG

## 2025-02-03 NOTE — PROGRESS NOTES
Health Maintenance Due   Topic Date Due    TETANUS VACCINE  Never done    Pneumococcal Vaccines (Age 50+) (1 of 2 - PCV) Never done    Shingles Vaccine (1 of 2) Never done    RSV Vaccine (Age 60+ and Pregnant patients) (1 - Risk 60-74 years 1-dose series) Never done    Influenza Vaccine (1) 09/01/2024    COVID-19 Vaccine (1 - 2024-25 season) Never done     Care Coordination Encounter Details:       MyChart Portal Status:         [x]  Reviewed MyChart Portal Status offered / enrolled if applicable        Additional Notes:     MyChart Outcomes: Portal status is pending          Updates Requested / Reviewed:        Updated Care Coordination Note, Care Everywhere, Care Team Updated, and Immunizations Reconciliation Completed or Queried: [place]         Health Maintenance Screening(s) Due:      Health Maintenance Topics Overdue:      VB Score: 0     Patient is not due for any topics at this time.    Influenza Vaccine  Pneumonia Vaccine  Tetanus Vaccine  Shingles/Zoster Vaccine  RSV Vaccine                  Health Maintenance Topic(s) Outreach Outcomes & Actions Taken:    Blood Pressure - Outreach Outcomes & Actions Taken  : Remote Blood Pressure Reading Captured. Patient seen by urology on 1/27/2025, BP noted 123/73, updated in flowsheets       Chronic Disease Management:     Diabetes Measures        Lab Results   Component Value Date    HGBA1C 6.0 (H) 01/16/2025           [x]  Reviewed chart for active Diabetes diagnosis     []  Scheduled necessary follow up appointments if needed         Additional Notes:  Patient is prediabetic            Hypertension Measures        BP Readings from Last 1 Encounters:   01/27/25 123/73           [x]  Reviewed chart for active Hypertension diagnosis     []  Reviewed & documented Home BP Cuff     [x]  Documented a Remote BP if needed & applicable     []  Scheduled necessary follow up appointments with Primary Care if needed         Additional Notes:             Provider Team  Continuity:     Last PCP Visit Date: 1/16/2025          [x]  Reviewed Primary Care Provider Visits, Annual Wellness Visit, and Future          Appointments to ensure appointments have been scheduled and/or           completed        Additional Notes:             Social Determinants of Health          [x]  Reviewed, completed, and/or updated the following sections:                  Food Insecurity, Transportation Needs, Financial Resource Strain,                 Tobacco Use        Additional Notes:             Care Management, Digital Medicine, and/or Education Referrals    OPCM Risk Score: 16.7         Next Steps - Referral Actions: Digital Medicine Outcomes and Actions Taken: Pt Declined or Not Eligible        Additional Notes:

## 2025-02-14 ENCOUNTER — HOSPITAL ENCOUNTER (OUTPATIENT)
Dept: RADIOLOGY | Facility: HOSPITAL | Age: 70
Discharge: HOME | End: 2025-02-14
Attending: STUDENT IN AN ORGANIZED HEALTH CARE EDUCATION/TRAINING PROGRAM
Payer: MEDICARE

## 2025-02-14 DIAGNOSIS — R97.20 ELEVATED PSA: ICD-10-CM

## 2025-02-14 PROCEDURE — A9585 GADOBUTROL INJECTION: HCPCS | Performed by: STUDENT IN AN ORGANIZED HEALTH CARE EDUCATION/TRAINING PROGRAM

## 2025-02-14 PROCEDURE — 72197 MRI PELVIS W/O & W/DYE: CPT | Mod: 26,,, | Performed by: INTERNAL MEDICINE

## 2025-02-14 PROCEDURE — 72197 MRI PELVIS W/O & W/DYE: CPT | Mod: TC

## 2025-02-14 PROCEDURE — 25500020 PHARM REV CODE 255: Performed by: STUDENT IN AN ORGANIZED HEALTH CARE EDUCATION/TRAINING PROGRAM

## 2025-02-14 RX ORDER — GADOBUTROL 604.72 MG/ML
10 INJECTION INTRAVENOUS
Status: COMPLETED | OUTPATIENT
Start: 2025-02-14 | End: 2025-02-14

## 2025-02-14 RX ADMIN — GADOBUTROL 10 ML: 604.72 INJECTION INTRAVENOUS at 03:02

## 2025-02-21 DIAGNOSIS — R97.20 ELEVATED PSA: Primary | ICD-10-CM

## 2025-03-05 ENCOUNTER — TELEPHONE (OUTPATIENT)
Dept: PRIMARY CARE CLINIC | Facility: CLINIC | Age: 70
End: 2025-03-05
Payer: MEDICARE

## 2025-03-05 NOTE — TELEPHONE ENCOUNTER
----- Message from Myra sent at 1/16/2025 12:02 PM CST -----  Started new bp meds, need to see what bp readings have been.

## 2025-03-07 ENCOUNTER — OFFICE VISIT (OUTPATIENT)
Dept: PRIMARY CARE CLINIC | Facility: CLINIC | Age: 70
End: 2025-03-07
Payer: MEDICARE

## 2025-03-07 VITALS
OXYGEN SATURATION: 95 % | TEMPERATURE: 99 F | SYSTOLIC BLOOD PRESSURE: 120 MMHG | DIASTOLIC BLOOD PRESSURE: 70 MMHG | RESPIRATION RATE: 16 BRPM | HEIGHT: 71 IN | HEART RATE: 72 BPM | WEIGHT: 224.63 LBS | BODY MASS INDEX: 31.45 KG/M2

## 2025-03-07 DIAGNOSIS — Z00.00 ANNUAL PHYSICAL EXAM: Primary | ICD-10-CM

## 2025-03-07 DIAGNOSIS — G47.30 SLEEP APNEA IN ADULT: ICD-10-CM

## 2025-03-07 DIAGNOSIS — R73.03 PREDIABETES: ICD-10-CM

## 2025-03-07 DIAGNOSIS — E78.5 HYPERLIPIDEMIA, UNSPECIFIED HYPERLIPIDEMIA TYPE: ICD-10-CM

## 2025-03-07 PROCEDURE — 99999 PR PBB SHADOW E&M-EST. PATIENT-LVL IV: CPT | Mod: PBBFAC,HCNC,, | Performed by: STUDENT IN AN ORGANIZED HEALTH CARE EDUCATION/TRAINING PROGRAM

## 2025-03-07 RX ORDER — IBUPROFEN 600 MG/1
600 TABLET ORAL EVERY 6 HOURS PRN
COMMUNITY
Start: 2024-11-15

## 2025-03-07 NOTE — PATIENT INSTRUCTIONS
IMPRESSION:  - Reviewed blood pressure, which is well-controlled without medication (120/70)  - Assessed hemoglobin A1c trend (6.0), indicating pre-diabetes range  - Evaluated cholesterol levels, noting significant improvement with statin therapy (total cholesterol decreased from 393 to 144, LDL from 303 to ~80)  - Calculated 10-year heart attack risk at 15%, reduced from potential 25% without statin  - Considered sleep study to evaluate nocturnal oxygen levels and apneic episodes, but deferred decision based on patient's daytime functionality and current SpO2 (95-98%)    PLAN SUMMARY:  - Continue atorvastatin at current dose  - Recommend sleep study to assess severity of sleep apnea  - Consider sleep medicine consultation for further evaluation  - Follow-up in 3 months to reassess lipid levels and medication efficacy  - Schedule follow-up to review sleep study results and discuss treatment plan if necessary    E78.5 HYPERLIPIDEMIA, UNSPECIFIED HYPERLIPIDEMIA TYPE:  - Explained the role of LDL and HDL cholesterol in cardiovascular health.  - Discussed the relationship between age and increasing cardiovascular risk.  - Continued atorvastatin at current dose.  - Advised the patient to maintain a healthy diet and regular exercise regimen to support cholesterol management.  - Scheduled a follow-up visit in 3 months to reassess lipid levels and medication efficacy.    G47.30 SLEEP APNEA IN ADULT:  - Provided comprehensive information on sleep apnea and its potential impacts on overall health.  - Explained the purpose and process of sleep studies, including at-home and in-office options.  - Discussed the importance of proper diagnosis and treatment for sleep apnea.  - Considered sleep medicine consultation for further evaluation of reported apneic episodes.  - Recommend a sleep study to assess the severity of the patient's condition and determine appropriate treatment options.  - Advised the patient on lifestyle  modifications that may help alleviate symptoms, such as weight loss and avoiding alcohol before bedtime.  - Scheduled a follow-up visit to review sleep study results and discuss treatment plan if necessary.

## 2025-03-07 NOTE — PROGRESS NOTES
Assessment:       1. Annual physical exam    2. Hyperlipidemia, unspecified hyperlipidemia type    3. Prediabetes    4. Sleep apnea in adult           Plan:     Assessment & Plan    Z00.00 Annual physical exam  E78.5 Hyperlipidemia, unspecified hyperlipidemia type  R73.03 Prediabetes  G47.30 Sleep apnea in adult    IMPRESSION:  - Reviewed blood pressure, which is well-controlled without medication (120/70)  - Assessed hemoglobin A1c trend (6.0), indicating pre-diabetes range  - Evaluated cholesterol levels, noting significant improvement with statin therapy (total cholesterol decreased from 393 to 144, LDL from 303 to ~80)  - Calculated 10-year heart attack risk at 15%, reduced from potential 25% without statin  - Considered sleep study to evaluate nocturnal oxygen levels and apneic episodes, but deferred decision based on patient's daytime functionality and current SpO2 (95-98%)    PLAN SUMMARY:  - Continue atorvastatin at current dose  - Recommend sleep study to assess severity of sleep apnea  - Consider sleep medicine consultation for further evaluation  - Follow-up in 3 months to reassess lipid levels and medication efficacy  - Schedule follow-up to review sleep study results and discuss treatment plan if necessary    E78.5 HYPERLIPIDEMIA, UNSPECIFIED HYPERLIPIDEMIA TYPE:  - Explained the role of LDL and HDL cholesterol in cardiovascular health.  - Discussed the relationship between age and increasing cardiovascular risk.  - Continued atorvastatin at current dose.  - Advised the patient to maintain a healthy diet and regular exercise regimen to support cholesterol management.  - Scheduled a follow-up visit in 3 months to reassess lipid levels and medication efficacy.    G47.30 SLEEP APNEA IN ADULT:  - Provided comprehensive information on sleep apnea and its potential impacts on overall health.  - Explained the purpose and process of sleep studies, including at-home and in-office options.  - Discussed the  importance of proper diagnosis and treatment for sleep apnea.  - Considered sleep medicine consultation for further evaluation of reported apneic episodes.  - Recommend a sleep study to assess the severity of the patient's condition and determine appropriate treatment options.  - Advised the patient on lifestyle modifications that may help alleviate symptoms, such as weight loss and avoiding alcohol before bedtime.  - Scheduled a follow-up visit to review sleep study results and discuss treatment plan if necessary.             Annual physical exam  -     CBC Auto Differential; Future; Expected date: 03/07/2025  -     Comprehensive Metabolic Panel; Future; Expected date: 03/07/2025  -     Lipid Panel; Future; Expected date: 03/07/2025  -     Hemoglobin A1C; Future; Expected date: 03/07/2025  -     TSH; Future; Expected date: 03/07/2025    Hyperlipidemia, unspecified hyperlipidemia type  -     CBC Auto Differential; Future; Expected date: 03/07/2025  -     Comprehensive Metabolic Panel; Future; Expected date: 03/07/2025  -     Lipid Panel; Future; Expected date: 03/07/2025  -     Hemoglobin A1C; Future; Expected date: 03/07/2025  -     TSH; Future; Expected date: 03/07/2025    Prediabetes  -     CBC Auto Differential; Future; Expected date: 03/07/2025  -     Comprehensive Metabolic Panel; Future; Expected date: 03/07/2025  -     Lipid Panel; Future; Expected date: 03/07/2025  -     Hemoglobin A1C; Future; Expected date: 03/07/2025  -     TSH; Future; Expected date: 03/07/2025    Sleep apnea in adult  Comments:  wife reports patient not breating for intervals over night.  Patient states during the day he needs to remind himself to breath.                This note was generated with the assistance of ambient listening technology. Verbal consent was obtained by the patient and accompanying visitor(s) for the recording of patient appointment to facilitate this note. I attest to having reviewed and edited the generated  note for accuracy, though some syntax or spelling errors may persist. Please contact the author of this note for any clarification.      Subjective:           Patient ID: Nigel Chan   Age:  69 y.o.  Sex: male     Chief Complaint:   Follow-up (HTN & pre-DM)      History of Present Illness:    Nigel Chan is a 69 y.o. male who presents today with a chief complaint of Follow-up (HTN & pre-DM)  .    70 yo male presenting for a f/u on HTN and DM.  Has been exercising more, is watching his diet.    Was rx Valsartan but has not taken it at all and BP was 120/70.    The 10-year ASCVD risk score (Stephan JOYCE, et al., 2019) is: 15.8%    Values used to calculate the score:      Age: 69 years      Sex: Male      Is Non- : Yes      Diabetic: No      Tobacco smoker: Yes      Systolic Blood Pressure: 120 mmHg      Is BP treated: No      HDL Cholesterol: 64 mg/dL      Total Cholesterol: 222 mg/dL        History of Present Illness    CHIEF COMPLAINT:  Nigel presents today for follow-up of hypertension.    HYPERTENSION:  He never took the prescribed Valsartan medication. He reports normal blood pressure when staying hydrated and denies palpitations or chest discomfort.    SLEEP:  He experiences apneic episodes during sleep but feels rested during the day. His sleep schedule is disrupted by daytime napping, leading to occasional nighttime wakefulness lasting up to two hours. He has not had a previous sleep study.    EXERCISE:  He frequently rides his bike, noting improved ability to do so in his current location due to better road conditions compared to [place].    LABS:  Hemoglobin A1C is 6.0%, remaining stable between 5.9-6.1% over the past two years. Cholesterol levels have improved with medication, with LDL decreasing from 303 mg/dL to 137-144 mg/dL in the past year. Previous total cholesterol was 393 mg/dL.      ROS:  Cardiovascular: -chest pain, -palpitations  Psychiatric: +sleep  "difficulty           Review of Systems   Constitutional: Negative.  Negative for fatigue and fever.   HENT: Negative.  Negative for congestion, rhinorrhea, sinus pressure and sinus pain.    Eyes: Negative.    Respiratory: Negative.  Negative for cough and wheezing.    Cardiovascular: Negative.  Negative for chest pain, palpitations and leg swelling.   Gastrointestinal: Negative.  Negative for constipation, diarrhea, nausea and vomiting.   Endocrine: Negative.    Genitourinary: Negative.  Negative for difficulty urinating, frequency and urgency.   Musculoskeletal: Negative.  Negative for back pain, joint swelling and neck stiffness.   Skin: Negative.    Allergic/Immunologic: Negative for food allergies.   Neurological:  Negative for weakness, numbness and headaches.   Psychiatric/Behavioral:  Positive for sleep disturbance (wife concerned of apnic episodes.). Negative for decreased concentration. The patient is not nervous/anxious.            Objective:        Vitals:    03/07/25 1046   BP: 120/70   BP Location: Right arm   Patient Position: Sitting   Pulse: 72   Resp: 16   Temp: 98.6 °F (37 °C)   TempSrc: Oral   SpO2: 95%   Weight: 101.9 kg (224 lb 10.4 oz)   Height: 5' 11" (1.803 m)       Body mass index is 31.33 kg/m².      Physical Exam  Vitals reviewed.   Constitutional:       General: He is not in acute distress.     Appearance: Normal appearance. He is not ill-appearing.      Comments: As per BMI.   HENT:      Head: Normocephalic and atraumatic.      Right Ear: External ear normal.      Left Ear: External ear normal.      Nose: Nose normal.   Eyes:      Extraocular Movements: Extraocular movements intact.      Conjunctiva/sclera: Conjunctivae normal.   Cardiovascular:      Rate and Rhythm: Normal rate.      Pulses: Normal pulses.      Heart sounds: No murmur heard.  Pulmonary:      Effort: Pulmonary effort is normal. No respiratory distress.   Abdominal:      General: Abdomen is flat.   Musculoskeletal:        " " General: No swelling or deformity.      Cervical back: Normal range of motion.   Neurological:      General: No focal deficit present.      Mental Status: He is alert and oriented to person, place, and time.      Gait: Gait normal.   Psychiatric:         Mood and Affect: Mood normal.         Physical Exam    Vitals: Blood pressure: 120/70. O2 saturation: 95%.               Past Medical History[1]    Lab Results   Component Value Date     01/16/2025    K 4.3 01/16/2025     01/16/2025    CO2 25 01/16/2025    BUN 18 01/16/2025    CREATININE 1.1 01/16/2025    ANIONGAP 9 01/16/2025     Lab Results   Component Value Date    HGBA1C 6.0 (H) 01/16/2025     No results found for: "BNP", "BNPTRIAGEBLO"    Lab Results   Component Value Date    WBC 4.61 01/16/2025    HGB 15.0 01/16/2025    HCT 45.8 01/16/2025     01/16/2025    GRAN 2.5 01/16/2025    GRAN 54.7 01/16/2025     Lab Results   Component Value Date    CHOL 222 (H) 01/16/2025    HDL 64 01/16/2025    LDLCALC 144.6 01/16/2025    TRIG 67 01/16/2025        Encounter Medications[2]              [1]   Past Medical History:  Diagnosis Date    Hyperlipidemia    [2]   Outpatient Encounter Medications as of 3/7/2025   Medication Sig Dispense Refill    aspirin (ECOTRIN) 81 MG EC tablet Take 1 tablet (81 mg total) by mouth once daily.      atorvastatin (LIPITOR) 80 MG tablet Take 1 tablet (80 mg total) by mouth once daily. 90 tablet 3    ibuprofen (ADVIL,MOTRIN) 600 MG tablet Take 600 mg by mouth every 6 (six) hours as needed.      tamsulosin (FLOMAX) 0.4 mg Cap Take 2 capsules (0.8 mg total) by mouth once daily. 180 capsule 3    [DISCONTINUED] valsartan (DIOVAN) 80 MG tablet Take 1 tablet (80 mg total) by mouth once daily. 90 tablet 3     No facility-administered encounter medications on file as of 3/7/2025.     "

## 2025-04-23 ENCOUNTER — TELEPHONE (OUTPATIENT)
Dept: PRIMARY CARE CLINIC | Facility: CLINIC | Age: 70
End: 2025-04-23
Payer: MEDICARE

## 2025-04-23 NOTE — TELEPHONE ENCOUNTER
Called pt and called pts pharmacy for med clarification on the flomax. Let both know if anything else was needed to give us a call

## 2025-04-30 DIAGNOSIS — E78.5 HYPERLIPIDEMIA, UNSPECIFIED HYPERLIPIDEMIA TYPE: ICD-10-CM

## 2025-04-30 DIAGNOSIS — R73.03 PREDIABETES: ICD-10-CM

## 2025-04-30 RX ORDER — ATORVASTATIN CALCIUM 80 MG/1
80 TABLET, FILM COATED ORAL
Qty: 90 TABLET | Refills: 2 | Status: SHIPPED | OUTPATIENT
Start: 2025-04-30

## 2025-04-30 NOTE — TELEPHONE ENCOUNTER
Refill Decision Note   Nigel Justinwood  is requesting a refill authorization.  Brief Assessment and Rationale for Refill:  Approve     Medication Therapy Plan:        Comments:     Note composed:11:42 AM 04/30/2025

## 2025-04-30 NOTE — TELEPHONE ENCOUNTER
No care due was identified.  [facility] Embedded Care Due Messages. Reference number: [id number].   4/30/2025 11:29:19 AM CDT

## 2025-06-23 ENCOUNTER — TELEPHONE (OUTPATIENT)
Dept: PHARMACY | Facility: CLINIC | Age: 70
End: 2025-06-23

## 2025-06-23 NOTE — TELEPHONE ENCOUNTER
[facility]/Population Health Chart Review & Patient Outreach Details For Medication Adherence Project    Reason for Outreach Encounter: 3rd Party payor non-compliance report ([employer], [employer], [employer], etc)  2.  Patient Outreach Method: Reviewed patient chart   3.   Medication in question:  Valsartan 80mg     Valsartan dc'ed 3/7/25    4.  Reviewed and or Updates Made To: Patient Chart  5. Outreach Outcomes and/or actions taken: Medication discontinued  Additional Notes: